# Patient Record
Sex: FEMALE | Race: WHITE | NOT HISPANIC OR LATINO | URBAN - METROPOLITAN AREA
[De-identification: names, ages, dates, MRNs, and addresses within clinical notes are randomized per-mention and may not be internally consistent; named-entity substitution may affect disease eponyms.]

---

## 2022-08-18 VITALS
HEART RATE: 78 BPM | RESPIRATION RATE: 16 BRPM | DIASTOLIC BLOOD PRESSURE: 87 MMHG | HEIGHT: 65 IN | WEIGHT: 134.26 LBS | SYSTOLIC BLOOD PRESSURE: 145 MMHG | TEMPERATURE: 98 F | OXYGEN SATURATION: 100 %

## 2022-08-18 RX ORDER — ALPRAZOLAM 0.25 MG
0 TABLET ORAL
Qty: 0 | Refills: 0 | DISCHARGE

## 2022-08-18 NOTE — ASU PATIENT PROFILE, ADULT - NSICDXPASTMEDICALHX_GEN_ALL_CORE_FT
PAST MEDICAL HISTORY:  History of IBS      PAST MEDICAL HISTORY:  H/O hiatal hernia     History of diverticulitis     History of IBS

## 2022-08-18 NOTE — PRE-OP CHECKLIST - SELECT TESTS ORDERED
BMP/CBC CBC/CMP/Urinalysis/EKG/COVID-19 CBC/CMP/Urinalysis/EKG/POCT Blood Glucose/COVID-19 CBC/CMP/Type and Screen/Urinalysis/EKG/POCT Blood Glucose/COVID-19

## 2022-08-19 ENCOUNTER — INPATIENT (INPATIENT)
Facility: HOSPITAL | Age: 67
LOS: 3 days | Discharge: HOME | DRG: 742 | End: 2022-08-23
Attending: OBSTETRICS & GYNECOLOGY | Admitting: OBSTETRICS & GYNECOLOGY
Payer: MEDICARE

## 2022-08-19 DIAGNOSIS — Z87.81 PERSONAL HISTORY OF (HEALED) TRAUMATIC FRACTURE: Chronic | ICD-10-CM

## 2022-08-19 PROCEDURE — 88307 TISSUE EXAM BY PATHOLOGIST: CPT | Mod: 26

## 2022-08-19 RX ORDER — OXYCODONE HYDROCHLORIDE 5 MG/1
5 TABLET ORAL
Refills: 0 | Status: DISCONTINUED | OUTPATIENT
Start: 2022-08-19 | End: 2022-08-19

## 2022-08-19 RX ORDER — IBUPROFEN 200 MG
600 TABLET ORAL EVERY 6 HOURS
Refills: 0 | Status: DISCONTINUED | OUTPATIENT
Start: 2022-08-19 | End: 2022-08-20

## 2022-08-19 RX ORDER — CELECOXIB 200 MG/1
400 CAPSULE ORAL ONCE
Refills: 0 | Status: COMPLETED | OUTPATIENT
Start: 2022-08-19 | End: 2022-08-19

## 2022-08-19 RX ORDER — PHENAZOPYRIDINE HCL 100 MG
200 TABLET ORAL EVERY 8 HOURS
Refills: 0 | Status: DISCONTINUED | OUTPATIENT
Start: 2022-08-19 | End: 2022-08-23

## 2022-08-19 RX ORDER — ACETAMINOPHEN 500 MG
1000 TABLET ORAL ONCE
Refills: 0 | Status: COMPLETED | OUTPATIENT
Start: 2022-08-19 | End: 2022-08-19

## 2022-08-19 RX ORDER — OXYCODONE HYDROCHLORIDE 5 MG/1
10 TABLET ORAL EVERY 4 HOURS
Refills: 0 | Status: DISCONTINUED | OUTPATIENT
Start: 2022-08-19 | End: 2022-08-19

## 2022-08-19 RX ORDER — SIMETHICONE 80 MG/1
80 TABLET, CHEWABLE ORAL EVERY 6 HOURS
Refills: 0 | Status: DISCONTINUED | OUTPATIENT
Start: 2022-08-19 | End: 2022-08-23

## 2022-08-19 RX ORDER — PANTOPRAZOLE SODIUM 20 MG/1
20 TABLET, DELAYED RELEASE ORAL DAILY
Refills: 0 | Status: DISCONTINUED | OUTPATIENT
Start: 2022-08-19 | End: 2022-08-19

## 2022-08-19 RX ORDER — TRAMADOL HYDROCHLORIDE 50 MG/1
25 TABLET ORAL EVERY 6 HOURS
Refills: 0 | Status: DISCONTINUED | OUTPATIENT
Start: 2022-08-19 | End: 2022-08-23

## 2022-08-19 RX ORDER — HEPARIN SODIUM 5000 [USP'U]/ML
5000 INJECTION INTRAVENOUS; SUBCUTANEOUS ONCE
Refills: 0 | Status: COMPLETED | OUTPATIENT
Start: 2022-08-19 | End: 2022-08-19

## 2022-08-19 RX ORDER — ACETAMINOPHEN 500 MG
1000 TABLET ORAL EVERY 6 HOURS
Refills: 0 | Status: DISCONTINUED | OUTPATIENT
Start: 2022-08-19 | End: 2022-08-23

## 2022-08-19 RX ORDER — KETOROLAC TROMETHAMINE 30 MG/ML
30 SYRINGE (ML) INJECTION EVERY 6 HOURS
Refills: 0 | Status: DISCONTINUED | OUTPATIENT
Start: 2022-08-19 | End: 2022-08-20

## 2022-08-19 RX ORDER — ALPRAZOLAM 0.25 MG
0 TABLET ORAL
Qty: 0 | Refills: 0 | DISCHARGE

## 2022-08-19 RX ORDER — PANTOPRAZOLE SODIUM 20 MG/1
40 TABLET, DELAYED RELEASE ORAL EVERY 24 HOURS
Refills: 0 | Status: DISCONTINUED | OUTPATIENT
Start: 2022-08-19 | End: 2022-08-21

## 2022-08-19 RX ORDER — HYDROMORPHONE HYDROCHLORIDE 2 MG/ML
0.5 INJECTION INTRAMUSCULAR; INTRAVENOUS; SUBCUTANEOUS
Refills: 0 | Status: DISCONTINUED | OUTPATIENT
Start: 2022-08-19 | End: 2022-08-23

## 2022-08-19 RX ORDER — FAMOTIDINE 10 MG/ML
20 INJECTION INTRAVENOUS DAILY
Refills: 0 | Status: DISCONTINUED | OUTPATIENT
Start: 2022-08-19 | End: 2022-08-21

## 2022-08-19 RX ORDER — SODIUM CHLORIDE 9 MG/ML
1000 INJECTION, SOLUTION INTRAVENOUS
Refills: 0 | Status: DISCONTINUED | OUTPATIENT
Start: 2022-08-19 | End: 2022-08-20

## 2022-08-19 RX ORDER — ONDANSETRON 8 MG/1
8 TABLET, FILM COATED ORAL EVERY 8 HOURS
Refills: 0 | Status: COMPLETED | OUTPATIENT
Start: 2022-08-19 | End: 2022-08-20

## 2022-08-19 RX ADMIN — Medication 1000 MILLIGRAM(S): at 18:55

## 2022-08-19 RX ADMIN — Medication 30 MILLIGRAM(S): at 23:25

## 2022-08-19 RX ADMIN — TRAMADOL HYDROCHLORIDE 25 MILLIGRAM(S): 50 TABLET ORAL at 22:34

## 2022-08-19 RX ADMIN — ONDANSETRON 8 MILLIGRAM(S): 8 TABLET, FILM COATED ORAL at 19:45

## 2022-08-19 RX ADMIN — Medication 1000 MILLIGRAM(S): at 06:43

## 2022-08-19 RX ADMIN — CELECOXIB 400 MILLIGRAM(S): 200 CAPSULE ORAL at 06:43

## 2022-08-19 RX ADMIN — Medication 30 MILLIGRAM(S): at 17:56

## 2022-08-19 RX ADMIN — Medication 1000 MILLIGRAM(S): at 17:55

## 2022-08-19 RX ADMIN — HEPARIN SODIUM 5000 UNIT(S): 5000 INJECTION INTRAVENOUS; SUBCUTANEOUS at 06:43

## 2022-08-19 RX ADMIN — SODIUM CHLORIDE 125 MILLILITER(S): 9 INJECTION, SOLUTION INTRAVENOUS at 12:21

## 2022-08-19 RX ADMIN — Medication 200 MILLIGRAM(S): at 17:55

## 2022-08-19 RX ADMIN — Medication 30 MILLIGRAM(S): at 18:11

## 2022-08-19 RX ADMIN — TRAMADOL HYDROCHLORIDE 25 MILLIGRAM(S): 50 TABLET ORAL at 21:34

## 2022-08-19 RX ADMIN — FAMOTIDINE 20 MILLIGRAM(S): 10 INJECTION INTRAVENOUS at 12:02

## 2022-08-19 RX ADMIN — Medication 30 MILLIGRAM(S): at 23:08

## 2022-08-19 RX ADMIN — Medication 1000 MILLIGRAM(S): at 12:21

## 2022-08-19 RX ADMIN — PANTOPRAZOLE SODIUM 40 MILLIGRAM(S): 20 TABLET, DELAYED RELEASE ORAL at 11:36

## 2022-08-19 RX ADMIN — SIMETHICONE 80 MILLIGRAM(S): 80 TABLET, CHEWABLE ORAL at 23:42

## 2022-08-19 RX ADMIN — Medication 1000 MILLIGRAM(S): at 11:36

## 2022-08-19 NOTE — H&P ADULT - HISTORY OF PRESENT ILLNESS
HPI:  65yo postmenopausal G0 w/hx of IBS here for scheduled ANTWON, BSO for fibroid uterus.                PAST MEDICAL & SURGICAL HISTORY:  History of IBS      History of diverticulitis      H/O hiatal hernia      H/O fracture of wrist  LEFT          MEDICATIONS  (STANDING): none    MEDICATIONS  (PRN): xanax prn      Allergies    penicillin (Rash)    Intolerances        PHYSICAL EXAM:   Vital Signs Last 24 Hrs  T(C): 36.7 (18 Aug 2022 10:24), Max: 36.7 (18 Aug 2022 10:24)  T(F): 98 (18 Aug 2022 10:24), Max: 98 (18 Aug 2022 10:24)  HR: 78 (18 Aug 2022 10:24) (78 - 78)  BP: 145/87 (18 Aug 2022 10:24) (145/87 - 145/87)  BP(mean): --  RR: 16 (18 Aug 2022 10:24) (16 - 16)  SpO2: 100% (18 Aug 2022 10:24) (100% - 100%)        **************************  Constitutional: Alert & Oriented x3, No acute distress, cooperative   Respiratory: Clear to ausculation bilaterally; no wheezing, rhonchi, or crackles  Cardiovascular: S1 &S2 physiologic, no murmurs, or gallops  Gastrointestinal: soft, non tender, positive bowel sounds, no rebound or guarding   Speculum exam:   Pelvic exam:   Extremities: no calf tenderness or swelling      LABS:                RADIOLOGY & ADDITIONAL STUDIES: HPI:  65yo postmenopausal G0 w/hx of IBS here for scheduled ANTWON, BSO for fibroid uterus.     Obhx: denies  Gynhx: uterine fibroids  PMH: IBS (C), hx diverticulitis  PSH: wrist surgery 2006  meds: zirtec prn  all: pen (childhood, rash)        PHYSICAL EXAM:   Vital Signs Last 24 Hrs  T(C): 36.7 (18 Aug 2022 10:24), Max: 36.7 (18 Aug 2022 10:24)  T(F): 98 (18 Aug 2022 10:24), Max: 98 (18 Aug 2022 10:24)  HR: 78 (18 Aug 2022 10:24) (78 - 78)  BP: 145/87 (18 Aug 2022 10:24) (145/87 - 145/87)  BP(mean): --  RR: 16 (18 Aug 2022 10:24) (16 - 16)  SpO2: 100% (18 Aug 2022 10:24) (100% - 100%)        **************************  Constitutional: Alert & Oriented x3, No acute distress, cooperative   Respiratory: Clear to ausculation bilaterally; no wheezing, rhonchi, or crackles  Cardiovascular: S1 &S2 physiologic, no murmurs, or gallops  Gastrointestinal: soft, non tender, positive bowel sounds, no rebound or guarding   Speculum exam:   Pelvic exam:   Extremities: no calf tenderness or swelling      LABS:                RADIOLOGY & ADDITIONAL STUDIES:

## 2022-08-19 NOTE — CHART NOTE - NSCHARTNOTEFT_GEN_A_CORE
Called by RN stating pt is complaining of bladder pain. Pt seen at bedside and examined.  Pt complaining of urge to urinate and abdominal cramping.  Stark catheter in place and UOP has been adequate since being out of the operating room.  Stark catheter flushed. Asked RN to do bladder scan stating she tried but was unable to due to pt's incision pain.  Pt is agreeable to bladder scan. Awaiting RN call for PVC.  Plan to give pyridium for bladder spasm. GYN TEAM aware.  Dr Yin notified. Called by RN stating pt is complaining of bladder pain. Pt seen at bedside and examined.  Pt complaining of urge to urinate and abdominal cramping.  Stark catheter in place and UOP has been adequate since being out of the operating room.  Stark catheter flushed. RN performed bladder scan and only saw about 1ml. Prescribing pyridium for bladder spasm PRN. GYN TEAM aware.  Dr Yin notified.

## 2022-08-19 NOTE — H&P ADULT - ASSESSMENT
65yo postmenopausal G0 w/hx of IBS here for scheduled ANTWON, BSO for fibroid uterus.   - consent signed  - will proceed with scheduled case  - d/w Dr. Yin

## 2022-08-19 NOTE — PRE-ANESTHESIA EVALUATION ADULT - NSANTHOSAYNRD_GEN_A_CORE
No. LUICE screening performed.  STOP BANG Legend: 0-2 = LOW Risk; 3-4 = INTERMEDIATE Risk; 5-8 = HIGH Risk

## 2022-08-19 NOTE — PRE-ANESTHESIA EVALUATION ADULT - NSANTHINDVINFOSD_GEN_ALL_CORE
Pt in clinic for nephrology appt    Pt states needs refill of nitrostat    Routing to Dr. Michael wilson for review   /patient/relative

## 2022-08-19 NOTE — PROGRESS NOTE ADULT - ASSESSMENT
A: 65yo POD0  s/p ANTWON, BSO for fibroid uterus, about 24w in size. Exparel used. Stable doing well at POC.      Plan:  1. Vital signs stable, continue to monitor per protocol  2. Pain control  3. DVT prophylaxis: SCDs, lovenox in AM after AM CBC  4. CV: IVH @ 125cc/hr  5. Pulm: Incentive spirometer (at least 10 times per hour while awake)   6. GI: Diet - CLD, ADAT  7. : Stark   8. Follow up labs: AM CBC  9. Activity: bedrest tonight

## 2022-08-19 NOTE — PACU DISCHARGE NOTE - COMMENTS
Pt post op total abdominal hysterectomy bilateral salpingo-ophrectomy. Pt is neurologically and hemodynamically stable and has met PACU discharge criteria. Report given to Chris lopez RN.

## 2022-08-19 NOTE — BRIEF OPERATIVE NOTE - OPERATION/FINDINGS
s/p ANTWON, BSO for fibroid uterus. Uterus enlarged to 24w in size. 3 large pedunculated fibroids noted, largest about 15cm. Normal appearing ovaries and tubes. IP ligaments clamped and tied, then bso performed. Round ligaments clamped, tied, then transected. Ureters palpated bilaterally. Bladder flap created with bovie and blunt dissection. Uterine arteries clamped then tied before being transected. Cardinal ligaments clamped, tied,  then transected with scalpel. Uterus and cervix amputated with bovie, vaginal cuff closed with 0 vicryl figure of 8's. Fascia closed with 0 vicryl. Exparel injected into subq and fascia. Subq closed with vicryl, skin closed with 3-0 monocryl and dermabond.

## 2022-08-20 DIAGNOSIS — N17.9 ACUTE KIDNEY FAILURE, UNSPECIFIED: ICD-10-CM

## 2022-08-20 LAB
ALBUMIN SERPL ELPH-MCNC: 2.6 G/DL — LOW (ref 3.3–5)
ALP SERPL-CCNC: 51 U/L — SIGNIFICANT CHANGE UP (ref 40–120)
ALT FLD-CCNC: 7 U/L — LOW (ref 10–45)
ANION GAP SERPL CALC-SCNC: 8 MMOL/L — SIGNIFICANT CHANGE UP (ref 5–17)
ANION GAP SERPL CALC-SCNC: 9 MMOL/L — SIGNIFICANT CHANGE UP (ref 5–17)
ANION GAP SERPL CALC-SCNC: 9 MMOL/L — SIGNIFICANT CHANGE UP (ref 5–17)
APPEARANCE UR: CLEAR — SIGNIFICANT CHANGE UP
AST SERPL-CCNC: 13 U/L — SIGNIFICANT CHANGE UP (ref 10–40)
BACTERIA # UR AUTO: PRESENT /HPF
BILIRUB SERPL-MCNC: 0.4 MG/DL — SIGNIFICANT CHANGE UP (ref 0.2–1.2)
BILIRUB SERPL-MCNC: 0.6 MG/DL — SIGNIFICANT CHANGE UP (ref 0.2–1.2)
BILIRUB UR-MCNC: NEGATIVE — SIGNIFICANT CHANGE UP
BUN SERPL-MCNC: 13 MG/DL — SIGNIFICANT CHANGE UP (ref 7–23)
BUN SERPL-MCNC: 14 MG/DL — SIGNIFICANT CHANGE UP (ref 7–23)
BUN SERPL-MCNC: 15 MG/DL — SIGNIFICANT CHANGE UP (ref 7–23)
CALCIUM SERPL-MCNC: 7.9 MG/DL — LOW (ref 8.4–10.5)
CALCIUM SERPL-MCNC: 8.1 MG/DL — LOW (ref 8.4–10.5)
CALCIUM SERPL-MCNC: 8.2 MG/DL — LOW (ref 8.4–10.5)
CHLORIDE SERPL-SCNC: 100 MMOL/L — SIGNIFICANT CHANGE UP (ref 96–108)
CHLORIDE SERPL-SCNC: 95 MMOL/L — LOW (ref 96–108)
CHLORIDE SERPL-SCNC: 99 MMOL/L — SIGNIFICANT CHANGE UP (ref 96–108)
CO2 SERPL-SCNC: 21 MMOL/L — LOW (ref 22–31)
CO2 SERPL-SCNC: 24 MMOL/L — SIGNIFICANT CHANGE UP (ref 22–31)
CO2 SERPL-SCNC: 25 MMOL/L — SIGNIFICANT CHANGE UP (ref 22–31)
COLOR SPEC: YELLOW — SIGNIFICANT CHANGE UP
CREAT ?TM UR-MCNC: 151 MG/DL — SIGNIFICANT CHANGE UP
CREAT SERPL-MCNC: 1.15 MG/DL — SIGNIFICANT CHANGE UP (ref 0.5–1.3)
CREAT SERPL-MCNC: 1.19 MG/DL — SIGNIFICANT CHANGE UP (ref 0.5–1.3)
CREAT SERPL-MCNC: 1.24 MG/DL — SIGNIFICANT CHANGE UP (ref 0.5–1.3)
DIFF PNL FLD: ABNORMAL
EGFR: 47 ML/MIN/1.73M2 — LOW
EGFR: 50 ML/MIN/1.73M2 — LOW
EGFR: 52 ML/MIN/1.73M2 — LOW
EPI CELLS # UR: SIGNIFICANT CHANGE UP /HPF (ref 0–5)
FERRITIN SERPL-MCNC: 83 NG/ML — SIGNIFICANT CHANGE UP (ref 15–150)
GLUCOSE SERPL-MCNC: 143 MG/DL — HIGH (ref 70–99)
GLUCOSE SERPL-MCNC: 158 MG/DL — HIGH (ref 70–99)
GLUCOSE SERPL-MCNC: 159 MG/DL — HIGH (ref 70–99)
GLUCOSE UR QL: NEGATIVE — SIGNIFICANT CHANGE UP
HCT VFR BLD CALC: 23.6 % — LOW (ref 34.5–45)
HCT VFR BLD CALC: 25.8 % — LOW (ref 34.5–45)
HCT VFR BLD CALC: 27.1 % — LOW (ref 34.5–45)
HCV AB S/CO SERPL IA: 0.03 S/CO — SIGNIFICANT CHANGE UP
HCV AB SERPL-IMP: SIGNIFICANT CHANGE UP
HGB BLD-MCNC: 7.3 G/DL — LOW (ref 11.5–15.5)
HGB BLD-MCNC: 8.4 G/DL — LOW (ref 11.5–15.5)
HGB BLD-MCNC: 8.8 G/DL — LOW (ref 11.5–15.5)
INR BLD: 1.1 — SIGNIFICANT CHANGE UP (ref 0.88–1.16)
IRON SATN MFR SERPL: 15 % — SIGNIFICANT CHANGE UP (ref 14–50)
IRON SATN MFR SERPL: 23 UG/DL — LOW (ref 30–160)
KETONES UR-MCNC: NEGATIVE — SIGNIFICANT CHANGE UP
LEUKOCYTE ESTERASE UR-ACNC: NEGATIVE — SIGNIFICANT CHANGE UP
MAGNESIUM SERPL-MCNC: 1.4 MG/DL — LOW (ref 1.6–2.6)
MAGNESIUM SERPL-MCNC: 2 MG/DL — SIGNIFICANT CHANGE UP (ref 1.6–2.6)
MCHC RBC-ENTMCNC: 29.6 PG — SIGNIFICANT CHANGE UP (ref 27–34)
MCHC RBC-ENTMCNC: 29.9 PG — SIGNIFICANT CHANGE UP (ref 27–34)
MCHC RBC-ENTMCNC: 30 PG — SIGNIFICANT CHANGE UP (ref 27–34)
MCHC RBC-ENTMCNC: 30.9 GM/DL — LOW (ref 32–36)
MCHC RBC-ENTMCNC: 32.5 GM/DL — SIGNIFICANT CHANGE UP (ref 32–36)
MCHC RBC-ENTMCNC: 32.6 GM/DL — SIGNIFICANT CHANGE UP (ref 32–36)
MCV RBC AUTO: 91.2 FL — SIGNIFICANT CHANGE UP (ref 80–100)
MCV RBC AUTO: 91.8 FL — SIGNIFICANT CHANGE UP (ref 80–100)
MCV RBC AUTO: 97.1 FL — SIGNIFICANT CHANGE UP (ref 80–100)
MELD SCORE WITH DIALYSIS: 24 POINTS — SIGNIFICANT CHANGE UP
MELD SCORE WITHOUT DIALYSIS: 9 POINTS — SIGNIFICANT CHANGE UP
NITRITE UR-MCNC: POSITIVE
NRBC # BLD: 0 /100 WBCS — SIGNIFICANT CHANGE UP (ref 0–0)
OSMOLALITY UR: 347 MOSM/KG — SIGNIFICANT CHANGE UP (ref 300–900)
PH UR: 6 — SIGNIFICANT CHANGE UP (ref 5–8)
PHOSPHATE SERPL-MCNC: 4.5 MG/DL — SIGNIFICANT CHANGE UP (ref 2.5–4.5)
PHOSPHATE SERPL-MCNC: 4.9 MG/DL — HIGH (ref 2.5–4.5)
PLATELET # BLD AUTO: 241 K/UL — SIGNIFICANT CHANGE UP (ref 150–400)
PLATELET # BLD AUTO: 347 K/UL — SIGNIFICANT CHANGE UP (ref 150–400)
PLATELET # BLD AUTO: 357 K/UL — SIGNIFICANT CHANGE UP (ref 150–400)
POTASSIUM SERPL-MCNC: 4 MMOL/L — SIGNIFICANT CHANGE UP (ref 3.5–5.3)
POTASSIUM SERPL-MCNC: 4.5 MMOL/L — SIGNIFICANT CHANGE UP (ref 3.5–5.3)
POTASSIUM SERPL-MCNC: 4.7 MMOL/L — SIGNIFICANT CHANGE UP (ref 3.5–5.3)
POTASSIUM SERPL-SCNC: 4 MMOL/L — SIGNIFICANT CHANGE UP (ref 3.5–5.3)
POTASSIUM SERPL-SCNC: 4.5 MMOL/L — SIGNIFICANT CHANGE UP (ref 3.5–5.3)
POTASSIUM SERPL-SCNC: 4.7 MMOL/L — SIGNIFICANT CHANGE UP (ref 3.5–5.3)
PROT SERPL-MCNC: 4.6 G/DL — LOW (ref 6–8.3)
PROT UR-MCNC: NEGATIVE MG/DL — SIGNIFICANT CHANGE UP
PROTHROM AB SERPL-ACNC: 13.1 SEC — SIGNIFICANT CHANGE UP (ref 10.5–13.4)
RBC # BLD: 2.43 M/UL — LOW (ref 3.8–5.2)
RBC # BLD: 2.81 M/UL — LOW (ref 3.8–5.2)
RBC # BLD: 2.97 M/UL — LOW (ref 3.8–5.2)
RBC # FLD: 12.6 % — SIGNIFICANT CHANGE UP (ref 10.3–14.5)
RBC # FLD: 12.6 % — SIGNIFICANT CHANGE UP (ref 10.3–14.5)
RBC # FLD: 12.9 % — SIGNIFICANT CHANGE UP (ref 10.3–14.5)
RBC CASTS # UR COMP ASSIST: < 5 /HPF — SIGNIFICANT CHANGE UP
SODIUM SERPL-SCNC: 128 MMOL/L — LOW (ref 135–145)
SODIUM SERPL-SCNC: 129 MMOL/L — LOW (ref 135–145)
SODIUM SERPL-SCNC: 133 MMOL/L — LOW (ref 135–145)
SODIUM UR-SCNC: <20 MMOL/L — SIGNIFICANT CHANGE UP
SP GR SPEC: 1.01 — SIGNIFICANT CHANGE UP (ref 1–1.03)
TIBC SERPL-MCNC: 155 UG/DL — LOW (ref 220–430)
TRANSFERRIN SERPL-MCNC: 144 MG/DL — LOW (ref 200–360)
UIBC SERPL-MCNC: 132 UG/DL — SIGNIFICANT CHANGE UP (ref 110–370)
UROBILINOGEN FLD QL: 0.2 E.U./DL — SIGNIFICANT CHANGE UP
WBC # BLD: 11.03 K/UL — HIGH (ref 3.8–10.5)
WBC # BLD: 12.27 K/UL — HIGH (ref 3.8–10.5)
WBC # BLD: 14.05 K/UL — HIGH (ref 3.8–10.5)
WBC # FLD AUTO: 11.03 K/UL — HIGH (ref 3.8–10.5)
WBC # FLD AUTO: 12.27 K/UL — HIGH (ref 3.8–10.5)
WBC # FLD AUTO: 14.05 K/UL — HIGH (ref 3.8–10.5)
WBC UR QL: < 5 /HPF — SIGNIFICANT CHANGE UP

## 2022-08-20 PROCEDURE — 74176 CT ABD & PELVIS W/O CONTRAST: CPT | Mod: 26

## 2022-08-20 PROCEDURE — 99223 1ST HOSP IP/OBS HIGH 75: CPT

## 2022-08-20 RX ORDER — MAGNESIUM SULFATE 500 MG/ML
2 VIAL (ML) INJECTION ONCE
Refills: 0 | Status: COMPLETED | OUTPATIENT
Start: 2022-08-20 | End: 2022-08-20

## 2022-08-20 RX ORDER — ALPRAZOLAM 0.25 MG
0.25 TABLET ORAL AT BEDTIME
Refills: 0 | Status: DISCONTINUED | OUTPATIENT
Start: 2022-08-20 | End: 2022-08-23

## 2022-08-20 RX ORDER — SODIUM CHLORIDE 9 MG/ML
1000 INJECTION, SOLUTION INTRAVENOUS ONCE
Refills: 0 | Status: COMPLETED | OUTPATIENT
Start: 2022-08-20 | End: 2022-08-20

## 2022-08-20 RX ORDER — SODIUM CHLORIDE 9 MG/ML
1000 INJECTION INTRAMUSCULAR; INTRAVENOUS; SUBCUTANEOUS
Refills: 0 | Status: DISCONTINUED | OUTPATIENT
Start: 2022-08-20 | End: 2022-08-21

## 2022-08-20 RX ADMIN — PANTOPRAZOLE SODIUM 40 MILLIGRAM(S): 20 TABLET, DELAYED RELEASE ORAL at 13:01

## 2022-08-20 RX ADMIN — Medication 25 GRAM(S): at 08:24

## 2022-08-20 RX ADMIN — SODIUM CHLORIDE 125 MILLILITER(S): 9 INJECTION INTRAMUSCULAR; INTRAVENOUS; SUBCUTANEOUS at 15:14

## 2022-08-20 RX ADMIN — Medication 0.25 MILLIGRAM(S): at 22:39

## 2022-08-20 RX ADMIN — Medication 1000 MILLIGRAM(S): at 06:48

## 2022-08-20 RX ADMIN — Medication 1000 MILLIGRAM(S): at 13:58

## 2022-08-20 RX ADMIN — Medication 1000 MILLIGRAM(S): at 12:58

## 2022-08-20 RX ADMIN — Medication 1000 MILLIGRAM(S): at 19:30

## 2022-08-20 RX ADMIN — Medication 30 MILLIGRAM(S): at 06:48

## 2022-08-20 RX ADMIN — ONDANSETRON 8 MILLIGRAM(S): 8 TABLET, FILM COATED ORAL at 09:57

## 2022-08-20 RX ADMIN — FAMOTIDINE 20 MILLIGRAM(S): 10 INJECTION INTRAVENOUS at 13:00

## 2022-08-20 RX ADMIN — Medication 1000 MILLIGRAM(S): at 23:41

## 2022-08-20 RX ADMIN — Medication 1000 MILLIGRAM(S): at 05:48

## 2022-08-20 RX ADMIN — SODIUM CHLORIDE 1000 MILLILITER(S): 9 INJECTION, SOLUTION INTRAVENOUS at 14:15

## 2022-08-20 RX ADMIN — Medication 1000 MILLIGRAM(S): at 18:30

## 2022-08-20 RX ADMIN — Medication 30 MILLIGRAM(S): at 05:48

## 2022-08-20 NOTE — CONSULT NOTE ADULT - ASSESSMENT
66 y.o. F patient s/p [facility] 8/19.  Post op Hg downtrending, currently 7.3, and Cre 1.24 (pre op Hg 15, Cre 0.7). Currently getting 1U PRBC. Renal, Medicine following.  CT showing hemoperitoneum in all 4 quadrants and 14cm hematoma intraperitoneal pelvis.  Stark in place, draining clear yellow urine, 780cc during day.    Consult called regarding whether there would be utility for ureteral stents.

## 2022-08-20 NOTE — CONSULT NOTE ADULT - SUBJECTIVE AND OBJECTIVE BOX
CONSULT NOTE - INTERNAL MEDICINE  *INCOMPLETE UNTIL DISCUSSED WITH MEDICINE ATTENDING*    ABIEL BROOKS  [id number]  66y  Female      Patient is a 66y old  Female who presents with a chief complaint of       PAST MEDICAL/SURGICAL HISTORY  PAST MEDICAL & SURGICAL HISTORY:  History of IBS      History of diverticulitis      H/O hiatal hernia      H/O fracture of wrist  LEFT          REVIEW OF SYSTEMS:  CONSTITUTIONAL: No fever, weight loss, or fatigue  EYES: No eye pain, visual disturbances, or discharge  ENMT:  No difficulty hearing, tinnitus, vertigo; No sinus or throat pain  NECK: No pain or stiffness  BREASTS: No pain, masses, or nipple discharge  RESPIRATORY: No cough, wheezing, chills or hemoptysis; No shortness of breath  CARDIOVASCULAR: No chest pain, palpitations, dizziness, or leg swelling  GASTROINTESTINAL: mild abdominal pain. o nausea, vomiting, or hematemesis; No diarrhea or constipation. No melena or hematochezia.  GENITOURINARY: No dysuria, frequency, hematuria, or incontinence  NEUROLOGICAL: No headaches, memory loss, loss of strength, numbness, or tremors  SKIN: No itching, burning, rashes, or lesions   LYMPH NODES: No enlarged glands  ENDOCRINE: No heat or cold intolerance; No hair loss  MUSCULOSKELETAL: No joint pain or swelling; No muscle, back, or extremity pain  PSYCHIATRIC: No depression, anxiety, mood swings, or difficulty sleeping  HEME/LYMPH: No easy bruising, or bleeding gums  ALLERY AND IMMUNOLOGIC: No hives or eczema    T(C): 36.7 (08-20-22 @ 08:30), Max: 36.8 (08-19-22 @ 20:06)  HR: 82 (08-20-22 @ 08:30) (67 - 100)  BP: 99/63 (08-20-22 @ 08:30) (92/60 - 100/64)  RR: 17 (08-20-22 @ 08:30) (16 - 18)  SpO2: 99% (08-20-22 @ 08:30) (96% - 99%)  Wt(kg): --Vital Signs Last 24 Hrs  T(C): 36.7 (20 Aug 2022 08:30), Max: 36.8 (19 Aug 2022 20:06)  T(F): 98 (20 Aug 2022 08:30), Max: 98.2 (19 Aug 2022 20:06)  HR: 82 (20 Aug 2022 08:30) (67 - 100)  BP: 99/63 (20 Aug 2022 08:30) (92/60 - 100/64)  BP(mean): --  RR: 17 (20 Aug 2022 08:30) (16 - 18)  SpO2: 99% (20 Aug 2022 08:30) (96% - 99%)    Parameters below as of 20 Aug 2022 08:30  Patient On (Oxygen Delivery Method): room air        PHYSICAL EXAM:  GENERAL: NAD, well-groomed, well-developed  HEAD:  Atraumatic, Normocephalic  EYES: EOMI, PERRLA, conjunctiva and sclera clear  ENMT: No tonsillar erythema, exudates, or enlargement; Moist mucous membranes, Good dentition, No lesions  NECK: Supple, No JVD, Normal thyroid  NERVOUS SYSTEM:  Alert & Oriented X3, Good concentration; Motor Strength 5/5 B/L upper and lower extremities; DTRs 2+ intact and symmetric  CHEST/LUNG: Clear to percussion bilaterally; No rales, rhonchi, wheezing, or rubs  HEART: Regular rate and rhythm; No murmurs, rubs, or gallops  ABDOMEN: mildly distended mildly tender  EXTREMITIES:  2+ Peripheral Pulses, No clubbing, cyanosis, or edema  LYMPH: No lymphadenopathy noted  SKIN: No rashes or lesions    Consultant(s) Notes Reviewed:  [x ] YES  [ ] NO  Care Discussed with Consultants/Other Providers [ x] YES  [ ] NO    LABS:                        8.4    12.27 )-----------( 347      ( 20 Aug 2022 10:11 )             25.8     08-20    129<L>  |  95<L>  |  15  ----------------------------<  143<H>  4.0   |  25  |  1.19    Ca    8.1<L>      20 Aug 2022 10:11  Phos  4.5     08-20  Mg     2.0     08-20    TPro  x   /  Alb  x   /  TBili  0.6  /  DBili  x   /  AST  x   /  ALT  x   /  AlkPhos  x   08-20    PT/INR - ( 20 Aug 2022 05:24 )   PT: 13.1 sec;   INR: 1.10                      RADIOLOGY & ADDITIONAL TESTS:    Imaging Personally Reviewed:  [ ] YES  [ ] NO

## 2022-08-20 NOTE — CONSULT NOTE ADULT - PROBLEM SELECTOR RECOMMENDATION 9
-Cased discussed with senior on call resident, whose recs are:  -Continue cortez to leg bag, monitor urine output  -Trend creatinine  -IV fluid hydration  -Repeat Renal US in 48hours to assess for hydro  -No acute surgical intervention at this time

## 2022-08-20 NOTE — CONSULT NOTE ADULT - ASSESSMENT
66F with pmhx of IBS who presented to the hospital in the setting of requiring ANTWON due to fibroid uterus. GYN reports preop cr of 0.7, no known history of ckd. Per team has a known Cr of 0.7. Creatinine also noted to be up to 1.15 -> 1.19. Patient with cortez catheter in place. Of note patients Hg preop was 15, has dropped to 8's. Per GYN no concern for bleed at this time and possible blood loss could have been 2/2 blood volume in uterus. Urine lytes indicate pre renal non oliguric EUGENE.   Nephrology also consulted    Recommendations:    Patient currently receiving 1 L LR bolus,   -c/w 125 cc/hr after for maintenance  -c/w foleys - monitor I&Os  -f/u repeat BMP later today  -Obtain renal sono  -f/u nephrology recs  -encourage po intake     C/f RP bleed i/s/o recent gyn procedure and drop in Hg from 15 to 8  -f/u US   -monitor hg    Note not finalized until attending attestation complete

## 2022-08-20 NOTE — CHART NOTE - NSCHARTNOTEFT_GEN_A_CORE
Nephrology Chart Note    66F with pmhx of IBS who presented to the hospital in the setting of requiring ANTWON due to fibroid uterus. Per team has a known Cr of 0.7. Per surgical documentation EBL was 25-50cc. However noted to have drop of Hgb from 12 -> 8.8 -> 8.4. Creatinine also noted to be up to 1.15 -> 1.19. Urine output in the last 24 hours was 650, and this  so far with a cotrez cathether. Vitals noted and remarkable for hypotension compared to 8/19 with elevation in HR. Nephrology consulted for non-oliguric EUGENE    #non-oligouric EUGENE  #Decreasing Hemoglobin  Pt noted to have drop in Hgb from 12 ->8.8 and now 8.4. Creatinine has risen from 0.7 -> 1.19. BP noted to be hypotensive to the 90's and heart rate elevated compared to day prior. Urine studies performed with UA <20 and Osm of 300 suggestive of a pre-renal etiology which could be seen in hypoperfusion state. Given recent surgery and drop in Hgb, concerned for possible RP bleed that needs to be evaluated immediately. May begin to develop an iATN with low SBP's  -Please obtain a CT scan with IV contrast STAT to evaluate for RP bleed; ok with contrast in this scenario given concern for possible RP bleed  -Please obtain UA  -Give 1L of LR at a STAT bolus  -Continue with 125cc/hr of LR  -C/w cortez to monitor strict I/O's  -Labs reviewed  -Avoid NSAID's (Celebrex and Toradol)  -Full consult note to follow in AM      Nikia Norton DO  PGY 5 - Nephrology Fellow  [phone] Nephrology Chart Note    66F with pmhx of IBS who presented to the hospital in the setting of requiring ANTWON due to fibroid uterus. Per team has a known Cr of 0.7. Per surgical documentation EBL was 25-50cc. However noted to have drop of Hgb from 12 -> 8.8 -> 8.4. Creatinine also noted to be up to 1.15 -> 1.19. Urine output in the last 24 hours was 650, and this  so far with a cortez cathether. Vitals noted and remarkable for hypotension compared to 8/19 with elevation in HR. Nephrology consulted for non-oliguric EUGENE    #non-oligouric EUGENE  #Anemia 2/2 ?ABL vs RP bleed?  Pt noted to have drop in Hgb from 12 ->8.8 and now 8.4. Creatinine has risen from 0.7 -> 1.19. BP noted to be hypotensive to the 90's and heart rate elevated compared to day prior. Urine studies performed with UA <20 and Osm of 300 suggestive of a pre-renal etiology which could be seen in hypoperfusion state. Given recent surgery and drop in Hgb, concerned for possible RP bleed that needs to be evaluated immediately. May begin to develop an iATN with low SBP's  -Please obtain a CT scan with IV contrast STAT to evaluate for RP bleed; ok with contrast in this scenario given concern for possible RP bleed  -Please obtain UA  -Give 1L of LR at a STAT bolus  -Obtain iron profile and ferritin  -Continue with 125cc/hr of LR  -C/w cortze to monitor strict I/O's  -Labs reviewed  -Avoid NSAID's (Celebrex and Toradol)  -Full consult note to follow in AM      Nikia Norton DO  PGY 5 - Nephrology Fellow  [phone] Nephrology Chart Note    66F with pmhx of IBS who presented to the hospital in the setting of requiring ANTWON due to fibroid uterus. Per team has a known Cr of 0.7. Per surgical documentation EBL was 25-50cc. However noted to have drop of Hgb from 12 -> 8.8 -> 8.4. Creatinine also noted to be up to 1.15 -> 1.19. Urine output in the last 24 hours was 650, and this  so far with a cortez cathether. Vitals noted and remarkable for hypotension compared to 8/19 with elevation in HR. Nephrology consulted for non-oliguric EUGENE    #non-oligouric EUGENE  #Anemia 2/2 ?ABL vs RP bleed?  Pt noted to have drop in Hgb from 12 ->8.8 and now 8.4. Creatinine has risen from 0.7 -> 1.19. BP noted to be hypotensive to the 90's and heart rate elevated compared to day prior. Urine studies performed with UA <20 and Osm of 300 suggestive of a pre-renal etiology which could be seen in hypoperfusion state. Given recent surgery and drop in Hgb, concerned for possible RP bleed that needs to be evaluated immediately. May begin to develop an iATN with low SBP's  -Please obtain UA  -Obtain renal sono  -Obtain BMP and CMP at 4pm; pending labs will discuss need for transfusion and need for possible CT scan  -Give 1L of LR at a STAT bolus  -Obtain iron profile and ferritin  -Continue with 125cc/hr of LR  -C/w cortez to monitor strict I/O's  -Labs reviewed  -Avoid NSAID's (Celebrex and Toradol)  -Full consult note to follow in AM      Nikia Norton DO  PGY 5 - Nephrology Fellow  [phone] Nephrology Chart Note    66F with pmhx of IBS who presented to the hospital in the setting of requiring ANTWON due to fibroid uterus. Per team has a known Cr of 0.7. Per surgical documentation EBL was 25-50cc. However noted to have drop of Hgb from 12 -> 8.8 -> 8.4. Creatinine also noted to be up to 1.15 -> 1.19. Urine output in the last 24 hours was 650, and this  so far with a cortez cathether. Vitals noted and remarkable for hypotension compared to 8/19 with elevation in HR. Nephrology consulted for non-oliguric EUGENE    #non-oligouric EUGENE  #Anemia 2/2 ?ABL vs RP bleed?  Pt noted to have drop in Hgb from 12 ->8.8 and now 8.4. Creatinine has risen from 0.7 -> 1.19. BP noted to be hypotensive to the 90's and heart rate elevated compared to day prior. Urine studies performed with UA <20 and Osm of 300 suggestive of a pre-renal etiology which could be seen in hypoperfusion state. Given recent surgery and drop in Hgb, concerned for possible RP bleed that needs to be evaluated immediately. May begin to develop an iATN with low SBP's  -Please obtain UA  -Obtain renal sono  -Obtain BMP and CMP at 4pm; pending labs will discuss need for transfusion and need for possible CT scan  -Give 1L of LR at a STAT bolus  -Obtain iron profile and ferritin  -Continue with 125cc/hr of LR  -C/w cortez to monitor strict I/O's  -Labs reviewed  -Avoid NSAID's (Celebrex and Toradol)  -Full consult note to follow in AM      Nikia Norton DO  PGY 5 - Nephrology Fellow  [phone]      *Addendum 5:11PM    After reviewing 4PM labs, noted Hgb down to 7.3 from 8.4 after 1L of LR bolus + maintenance fluids. Creatinine with slight increase as well. Still remain suspicious for possible intra-abdominal process such as slow bleed vs ureter or bladder involvement. Relayed recommendation from Nephrology perspective would recommend pursuing CT A/P with IV contrast. Team to discuss with attending

## 2022-08-20 NOTE — CONSULT NOTE ADULT - SUBJECTIVE AND OBJECTIVE BOX
66 y.o. F patient with EUGENE/anemia s/p ANTWON-BSO .  Stark placed after experiencing abdominal discomfort post op.  Currently with abdominal discomfort, but no back pain.  No N/V/D/F.      PAST MEDICAL & SURGICAL HISTORY:  History of IBS  History of diverticulitis  H/O hiatal hernia  H/O fracture of left wrist    ALLERGIES:  penicillin (Rash)    LABS/IMAGIN.3    11.03 )-----------( 241      ( 20 Aug 2022 16:35 )             23.6   08-20    128<L>  |  99  |  14  ----------------------------<  158<H>  4.5   |  21<L>  |  1.24    Ca    7.9<L>      20 Aug 2022 16:35  Phos  4.5     08-20  Mg     2.0     08-20    TPro  4.6<L>  /  Alb  2.6<L>  /  TBili  0.4  /  DBili  x   /  AST  13  /  ALT  7<L>  /  AlkPhos  51  08-20    Urinalysis Basic - ( 20 Aug 2022 14:38 )    Color: Yellow / Appearance: Clear / S.010 / pH: x  Gluc: x / Ketone: NEGATIVE  / Bili: Negative / Urobili: 0.2 E.U./dL   Blood: x / Protein: NEGATIVE mg/dL / Nitrite: POSITIVE   Leuk Esterase: NEGATIVE / RBC: < 5 /HPF / WBC < 5 /HPF   Sq Epi: x / Non Sq Epi: 0-5 /HPF / Bacteria: Present /HPF    CT abd/pel: (prelim)   IMPRESSION:  1. Large volume hemoperitoneum in all 4 quadrants and the pelvis in   addition to  a formed 14 cm hematoma in anterior intraperitoneal pelvis. Small amount   of  hematoma at the vaginal cuff.  2. Stranding adjacent to a diverticular segment of the sigmoid colon   (images  , axial series 3) is suspicious for acute diverticulitis though   this may  be edema/stranding related to recent surgery and presence of large volume  hemoperitoneum.      PHYSICAL EXAM:  Vital Signs Last 24 Hrs  T(C): 37 (20 Aug 2022 20:14), Max: 37 (20 Aug 2022 20:14)  T(F): 98.6 (20 Aug 2022 20:14), Max: 98.6 (20 Aug 2022 20:14)  HR: 100 (20 Aug 2022 20:14) (82 - 100)  BP: 96/58 (20 Aug 2022 20:14) (92/60 - 119/75)  BP(mean): --  RR: 16 (20 Aug 2022 20:14) (16 - 17)  SpO2: 95% (20 Aug 2022 20:14) (95% - 99%)    Parameters below as of 20 Aug 2022 20:14  Patient On (Oxygen Delivery Method): room air      Gen: NAD, AAOx3, family at bedside           66 y.o. F patient with EUGENE/anemia s/p ANTWON-BSO .  Stark placed after experiencing abdominal discomfort post op.  Currently with abdominal discomfort, but no back pain.  No N/V/D/F.      PAST MEDICAL & SURGICAL HISTORY:  History of IBS  History of diverticulitis  H/O hiatal hernia  H/O fracture of left wrist    ALLERGIES:  penicillin (Rash)    LABS/IMAGIN.3    11.03 )-----------( 241      ( 20 Aug 2022 16:35 )             23.6   08-20    128<L>  |  99  |  14  ----------------------------<  158<H>  4.5   |  21<L>  |  1.24    Ca    7.9<L>      20 Aug 2022 16:35  Phos  4.5     08-20  Mg     2.0     08-20    TPro  4.6<L>  /  Alb  2.6<L>  /  TBili  0.4  /  DBili  x   /  AST  13  /  ALT  7<L>  /  AlkPhos  51  08-20    Urinalysis Basic - ( 20 Aug 2022 14:38 )    Color: Yellow / Appearance: Clear / S.010 / pH: x  Gluc: x / Ketone: NEGATIVE  / Bili: Negative / Urobili: 0.2 E.U./dL   Blood: x / Protein: NEGATIVE mg/dL / Nitrite: POSITIVE   Leuk Esterase: NEGATIVE / RBC: < 5 /HPF / WBC < 5 /HPF   Sq Epi: x / Non Sq Epi: 0-5 /HPF / Bacteria: Present /HPF    CT abd/pel: (prelim)   IMPRESSION:  1. Large volume hemoperitoneum in all 4 quadrants and the pelvis in   addition to  a formed 14 cm hematoma in anterior intraperitoneal pelvis. Small amount   of  hematoma at the vaginal cuff.  2. Stranding adjacent to a diverticular segment of the sigmoid colon   (images  , axial series 3) is suspicious for acute diverticulitis though   this may  be edema/stranding related to recent surgery and presence of large volume  hemoperitoneum.      PHYSICAL EXAM:  Vital Signs Last 24 Hrs  T(C): 37 (20 Aug 2022 20:14), Max: 37 (20 Aug 2022 20:14)  T(F): 98.6 (20 Aug 2022 20:14), Max: 98.6 (20 Aug 2022 20:14)  HR: 100 (20 Aug 2022 20:14) (82 - 100)  BP: 96/58 (20 Aug 2022 20:14) (92/60 - 119/75)  BP(mean): --  RR: 16 (20 Aug 2022 20:14) (16 - 17)  SpO2: 95% (20 Aug 2022 20:14) (95% - 99%)    Parameters below as of 20 Aug 2022 20:14  Patient On (Oxygen Delivery Method): room air      NAD, AAOx3, family at bedside  Denies back pain b/l  Stark in place draining clear yellow urine

## 2022-08-20 NOTE — CHART NOTE - NSCHARTNOTEFT_GEN_A_CORE
Pt evaluated at bedside. Pt states she feels overall with some fatigue. Pt is eating sitting up in bed. she states she got out of bed earlier in the day and walked around her room but felt slightly lightheaded at the time. PT was sitting in bed with 1uPRBC running. Pt denied fevers, chills, SOB, lightheadedness, CP, palpitation, N/V    PE: GEN: NAD  ABD: soft, mild distention, appropriately tender to palpation, no rebound or guarding    Plan to draw post transfusion CBC and consult IR for possible drainage of hematoma seen on CT.

## 2022-08-20 NOTE — CONSULT NOTE ADULT - ATTENDING COMMENTS
Patient was seen and examined at bedside. Case discuss with resident.  In brief summary the pt is a 66F with pmhx of IBS who presented to the hospital in the setting of requiring ANTWON due to fibroid uterus. Pt's initial cr of 0.7,now increased to 1.19. In addition pt with decrease in Hgb from 14 to 8 following surgery.     OBJECTIVE:  Vital Signs Last 24 Hrs  T(C): 36.7 (20 Aug 2022 08:30), Max: 36.8 (19 Aug 2022 20:06)  T(F): 98 (20 Aug 2022 08:30), Max: 98.2 (19 Aug 2022 20:06)  HR: 82 (20 Aug 2022 08:30) (67 - 100)  BP: 99/63 (20 Aug 2022 08:30) (92/60 - 100/64)  BP(mean): --  RR: 17 (20 Aug 2022 08:30) (16 - 18)  SpO2: 99% (20 Aug 2022 08:30) (96% - 99%)    Parameters below as of 20 Aug 2022 08:30  Patient On (Oxygen Delivery Method): room air    PHYSICAL EXAM:  Gen: NAD sitting up in bed; family at bedside   CV: RRR, +S1/S2, no mumur  Pulm: CTA b/l no wheezing or crackles   Abd: soft, NTND + BS no rebound or guarding       LABS:                        8.4    12.27 )-----------( 347      ( 20 Aug 2022 10:11 )             25.8     08-20    129<L>  |  95<L>  |  15  ----------------------------<  143<H>  4.0   |  25  |  1.19    Ca    8.1<L>      20 Aug 2022 10:11  Phos  4.5     08-20  Mg     2.0     08-20    TPro  x   /  Alb  x   /  TBili  0.6  /  DBili  x   /  AST  x   /  ALT  x   /  AlkPhos  x   08-20      PT/INR - ( 20 Aug 2022 05:24 )   PT: 13.1 sec;   INR: 1.10         Urinalysis Basic - ( 20 Aug 2022 14:38 )    Color: Yellow / Appearance: Clear / S.010 / pH: x  Gluc: x / Ketone: NEGATIVE  / Bili: Negative / Urobili: 0.2 E.U./dL   Blood: x / Protein: NEGATIVE mg/dL / Nitrite: POSITIVE   Leuk Esterase: NEGATIVE / RBC: x / WBC x   Sq Epi: x / Non Sq Epi: x / Bacteria: x     MEDICATIONS  (STANDING):  acetaminophen     Tablet .. 1000 milliGRAM(s) Oral every 6 hours  famotidine Injectable 20 milliGRAM(s) IV Push daily  pantoprazole  Injectable 40 milliGRAM(s) IV Push every 24 hours  sodium chloride 0.9%. 1000 milliLiter(s) (125 mL/Hr) IV Continuous <Continuous>        A/P:  66F with pmhx of IBS who presented to the hospital in the setting of requiring ANTWON due to fibroid uterus. Pt's initial cr of 0.7,now increased to 1.19. In addition pt with decrease in Hgb from 14 to 8 following surgery.     #EUGENE likely secondary to dehydration  - Renal consult appreciated  - Will continue IVF and f/u repeat creatinine   -Avoid NSAID's (Celebrex and Toradol)  -Check renal U/S     #Acute blood loss anemia  - Will f/u repeat CBC this afternoon  - Consider CT abdomen to evaluate for possible retroperitoneal bleed   - Check iron profile and ferritin  - Continue PPI

## 2022-08-20 NOTE — CHART NOTE - NSCHARTNOTEFT_GEN_A_CORE
Pt seen at bedside. Pt states she feels well but feels fatigued.       Vitals  /75  Hr 83  Gen: NAD  ABD: soft, appropriate tenderness to palpation, mild abd distention, no rebound or guarding, negative CVA tenderness  EXT: wnl    Pt counseled and consented for 1 uPRBC and plan for renal US and CT scan w/o contrast to asses for hemoperitoneum.

## 2022-08-20 NOTE — PROGRESS NOTE ADULT - ASSESSMENT
65yo s/p ANTWON, BSO for fibroid uterus, about 24w in size. Exparel used    Neuro: Tylenol,  tramadol PRN, holding NSAIDS due to Cr rise  CV: euvolemic  Lung: RA  GI/FEN: /hr, CLD, -/-, zofran PRN, simethicone, protonix, pepcid ATC  : cortez, pyridium 200mg q8h PRN bladder spasm   VTE: SCD  Labs: noon CBC, BMP, urine lytes to reassess rise in Cr

## 2022-08-21 LAB
ALBUMIN SERPL ELPH-MCNC: 2.8 G/DL — LOW (ref 3.3–5)
ALP SERPL-CCNC: 49 U/L — SIGNIFICANT CHANGE UP (ref 40–120)
ALT FLD-CCNC: 6 U/L — LOW (ref 10–45)
ANION GAP SERPL CALC-SCNC: 4 MMOL/L — LOW (ref 5–17)
ANION GAP SERPL CALC-SCNC: 5 MMOL/L — SIGNIFICANT CHANGE UP (ref 5–17)
APTT BLD: 27.2 SEC — LOW (ref 27.5–35.5)
AST SERPL-CCNC: 11 U/L — SIGNIFICANT CHANGE UP (ref 10–40)
BASOPHILS # BLD AUTO: 0.04 K/UL — SIGNIFICANT CHANGE UP (ref 0–0.2)
BASOPHILS NFR BLD AUTO: 0.4 % — SIGNIFICANT CHANGE UP (ref 0–2)
BILIRUB SERPL-MCNC: 1.1 MG/DL — SIGNIFICANT CHANGE UP (ref 0.2–1.2)
BUN SERPL-MCNC: 10 MG/DL — SIGNIFICANT CHANGE UP (ref 7–23)
BUN SERPL-MCNC: 13 MG/DL — SIGNIFICANT CHANGE UP (ref 7–23)
CALCIUM SERPL-MCNC: 7.9 MG/DL — LOW (ref 8.4–10.5)
CALCIUM SERPL-MCNC: 8.2 MG/DL — LOW (ref 8.4–10.5)
CHLORIDE SERPL-SCNC: 108 MMOL/L — SIGNIFICANT CHANGE UP (ref 96–108)
CHLORIDE SERPL-SCNC: 108 MMOL/L — SIGNIFICANT CHANGE UP (ref 96–108)
CO2 SERPL-SCNC: 26 MMOL/L — SIGNIFICANT CHANGE UP (ref 22–31)
CO2 SERPL-SCNC: 28 MMOL/L — SIGNIFICANT CHANGE UP (ref 22–31)
CREAT SERPL-MCNC: 0.77 MG/DL — SIGNIFICANT CHANGE UP (ref 0.5–1.3)
CREAT SERPL-MCNC: 0.96 MG/DL — SIGNIFICANT CHANGE UP (ref 0.5–1.3)
EGFR: 64 ML/MIN/1.73M2 — SIGNIFICANT CHANGE UP
EGFR: 84 ML/MIN/1.73M2 — SIGNIFICANT CHANGE UP
EOSINOPHIL # BLD AUTO: 0.04 K/UL — SIGNIFICANT CHANGE UP (ref 0–0.5)
EOSINOPHIL NFR BLD AUTO: 0.4 % — SIGNIFICANT CHANGE UP (ref 0–6)
FIBRINOGEN PPP-MCNC: 379 MG/DL — SIGNIFICANT CHANGE UP (ref 258–438)
GLUCOSE SERPL-MCNC: 100 MG/DL — HIGH (ref 70–99)
GLUCOSE SERPL-MCNC: 107 MG/DL — HIGH (ref 70–99)
HCT VFR BLD CALC: 20.1 % — CRITICAL LOW (ref 34.5–45)
HCT VFR BLD CALC: 26.6 % — LOW (ref 34.5–45)
HCT VFR BLD CALC: 28.3 % — LOW (ref 34.5–45)
HGB BLD-MCNC: 7 G/DL — CRITICAL LOW (ref 11.5–15.5)
HGB BLD-MCNC: 8.8 G/DL — LOW (ref 11.5–15.5)
HGB BLD-MCNC: 9.2 G/DL — LOW (ref 11.5–15.5)
IMM GRANULOCYTES NFR BLD AUTO: 0.5 % — SIGNIFICANT CHANGE UP (ref 0–1.5)
INR BLD: 1.07 — SIGNIFICANT CHANGE UP (ref 0.88–1.16)
LYMPHOCYTES # BLD AUTO: 1.5 K/UL — SIGNIFICANT CHANGE UP (ref 1–3.3)
LYMPHOCYTES # BLD AUTO: 16.1 % — SIGNIFICANT CHANGE UP (ref 13–44)
MAGNESIUM SERPL-MCNC: 2.2 MG/DL — SIGNIFICANT CHANGE UP (ref 1.6–2.6)
MCHC RBC-ENTMCNC: 29.1 PG — SIGNIFICANT CHANGE UP (ref 27–34)
MCHC RBC-ENTMCNC: 29.6 PG — SIGNIFICANT CHANGE UP (ref 27–34)
MCHC RBC-ENTMCNC: 30.4 PG — SIGNIFICANT CHANGE UP (ref 27–34)
MCHC RBC-ENTMCNC: 32.5 GM/DL — SIGNIFICANT CHANGE UP (ref 32–36)
MCHC RBC-ENTMCNC: 33.1 GM/DL — SIGNIFICANT CHANGE UP (ref 32–36)
MCHC RBC-ENTMCNC: 34.8 GM/DL — SIGNIFICANT CHANGE UP (ref 32–36)
MCV RBC AUTO: 87.4 FL — SIGNIFICANT CHANGE UP (ref 80–100)
MCV RBC AUTO: 88.1 FL — SIGNIFICANT CHANGE UP (ref 80–100)
MCV RBC AUTO: 91 FL — SIGNIFICANT CHANGE UP (ref 80–100)
MONOCYTES # BLD AUTO: 1.04 K/UL — HIGH (ref 0–0.9)
MONOCYTES NFR BLD AUTO: 11.2 % — SIGNIFICANT CHANGE UP (ref 2–14)
NEUTROPHILS # BLD AUTO: 6.64 K/UL — SIGNIFICANT CHANGE UP (ref 1.8–7.4)
NEUTROPHILS NFR BLD AUTO: 71.4 % — SIGNIFICANT CHANGE UP (ref 43–77)
NRBC # BLD: 0 /100 WBCS — SIGNIFICANT CHANGE UP (ref 0–0)
PHOSPHATE SERPL-MCNC: 2.5 MG/DL — SIGNIFICANT CHANGE UP (ref 2.5–4.5)
PLATELET # BLD AUTO: 207 K/UL — SIGNIFICANT CHANGE UP (ref 150–400)
PLATELET # BLD AUTO: 221 K/UL — SIGNIFICANT CHANGE UP (ref 150–400)
PLATELET # BLD AUTO: 223 K/UL — SIGNIFICANT CHANGE UP (ref 150–400)
POTASSIUM SERPL-MCNC: 3.8 MMOL/L — SIGNIFICANT CHANGE UP (ref 3.5–5.3)
POTASSIUM SERPL-MCNC: 4.2 MMOL/L — SIGNIFICANT CHANGE UP (ref 3.5–5.3)
POTASSIUM SERPL-SCNC: 3.8 MMOL/L — SIGNIFICANT CHANGE UP (ref 3.5–5.3)
POTASSIUM SERPL-SCNC: 4.2 MMOL/L — SIGNIFICANT CHANGE UP (ref 3.5–5.3)
PROT SERPL-MCNC: 4.8 G/DL — LOW (ref 6–8.3)
PROTHROM AB SERPL-ACNC: 12.8 SEC — SIGNIFICANT CHANGE UP (ref 10.5–13.4)
RBC # BLD: 2.3 M/UL — LOW (ref 3.8–5.2)
RBC # BLD: 3.02 M/UL — LOW (ref 3.8–5.2)
RBC # BLD: 3.11 M/UL — LOW (ref 3.8–5.2)
RBC # FLD: 13.2 % — SIGNIFICANT CHANGE UP (ref 10.3–14.5)
RBC # FLD: 14 % — SIGNIFICANT CHANGE UP (ref 10.3–14.5)
RBC # FLD: 14.2 % — SIGNIFICANT CHANGE UP (ref 10.3–14.5)
SODIUM SERPL-SCNC: 139 MMOL/L — SIGNIFICANT CHANGE UP (ref 135–145)
SODIUM SERPL-SCNC: 140 MMOL/L — SIGNIFICANT CHANGE UP (ref 135–145)
WBC # BLD: 8.03 K/UL — SIGNIFICANT CHANGE UP (ref 3.8–10.5)
WBC # BLD: 9.21 K/UL — SIGNIFICANT CHANGE UP (ref 3.8–10.5)
WBC # BLD: 9.31 K/UL — SIGNIFICANT CHANGE UP (ref 3.8–10.5)
WBC # FLD AUTO: 8.03 K/UL — SIGNIFICANT CHANGE UP (ref 3.8–10.5)
WBC # FLD AUTO: 9.21 K/UL — SIGNIFICANT CHANGE UP (ref 3.8–10.5)
WBC # FLD AUTO: 9.31 K/UL — SIGNIFICANT CHANGE UP (ref 3.8–10.5)

## 2022-08-21 PROCEDURE — 99232 SBSQ HOSP IP/OBS MODERATE 35: CPT

## 2022-08-21 PROCEDURE — 99233 SBSQ HOSP IP/OBS HIGH 50: CPT

## 2022-08-21 PROCEDURE — 76775 US EXAM ABDO BACK WALL LIM: CPT | Mod: 26

## 2022-08-21 RX ORDER — SODIUM,POTASSIUM PHOSPHATES 278-250MG
3 POWDER IN PACKET (EA) ORAL ONCE
Refills: 0 | Status: COMPLETED | OUTPATIENT
Start: 2022-08-21 | End: 2022-08-21

## 2022-08-21 RX ORDER — IRON SUCROSE 20 MG/ML
200 INJECTION, SOLUTION INTRAVENOUS EVERY 24 HOURS
Refills: 0 | Status: DISCONTINUED | OUTPATIENT
Start: 2022-08-21 | End: 2022-08-23

## 2022-08-21 RX ORDER — FAMOTIDINE 10 MG/ML
20 INJECTION INTRAVENOUS EVERY 24 HOURS
Refills: 0 | Status: DISCONTINUED | OUTPATIENT
Start: 2022-08-21 | End: 2022-08-23

## 2022-08-21 RX ORDER — POTASSIUM CHLORIDE 20 MEQ
20 PACKET (EA) ORAL ONCE
Refills: 0 | Status: COMPLETED | OUTPATIENT
Start: 2022-08-21 | End: 2022-08-21

## 2022-08-21 RX ORDER — ACETAMINOPHEN 500 MG
1000 TABLET ORAL ONCE
Refills: 0 | Status: COMPLETED | OUTPATIENT
Start: 2022-08-21 | End: 2022-08-21

## 2022-08-21 RX ORDER — PANTOPRAZOLE SODIUM 20 MG/1
40 TABLET, DELAYED RELEASE ORAL EVERY 24 HOURS
Refills: 0 | Status: DISCONTINUED | OUTPATIENT
Start: 2022-08-21 | End: 2022-08-23

## 2022-08-21 RX ADMIN — Medication 400 MILLIGRAM(S): at 08:06

## 2022-08-21 RX ADMIN — Medication 1000 MILLIGRAM(S): at 19:10

## 2022-08-21 RX ADMIN — Medication 1000 MILLIGRAM(S): at 00:41

## 2022-08-21 RX ADMIN — Medication 1000 MILLIGRAM(S): at 15:17

## 2022-08-21 RX ADMIN — Medication 0.25 MILLIGRAM(S): at 21:41

## 2022-08-21 RX ADMIN — IRON SUCROSE 110 MILLIGRAM(S): 20 INJECTION, SOLUTION INTRAVENOUS at 14:18

## 2022-08-21 RX ADMIN — Medication 1000 MILLIGRAM(S): at 08:21

## 2022-08-21 RX ADMIN — Medication 3 TABLET(S): at 14:17

## 2022-08-21 RX ADMIN — PANTOPRAZOLE SODIUM 40 MILLIGRAM(S): 20 TABLET, DELAYED RELEASE ORAL at 14:17

## 2022-08-21 RX ADMIN — Medication 1000 MILLIGRAM(S): at 14:17

## 2022-08-21 RX ADMIN — Medication 20 MILLIEQUIVALENT(S): at 14:17

## 2022-08-21 RX ADMIN — FAMOTIDINE 20 MILLIGRAM(S): 10 INJECTION INTRAVENOUS at 14:17

## 2022-08-21 NOTE — CONSULT NOTE ADULT - ATTENDING COMMENTS
prerenal/hemodynamic EUGENE resolved, acute blood loss anemia stabilized s/p transfusions, intraabdominal hematoma with no signs of  obstruction, hypotension also improved

## 2022-08-21 NOTE — PROVIDER CONTACT NOTE (CRITICAL VALUE NOTIFICATION) - BACKGROUND
Pt is post GYN surgery. 1 unit of blood was given previously by AGAPITO Ramos. Labs were drawn afterwards to assess values.

## 2022-08-21 NOTE — CONSULT NOTE ADULT - ASSESSMENT
66F with pmhx of IBS who presented to the hospital on 8/19 in the setting of scheduled ANTWON and BSO for fibroid uterus.  Found to have rising creatinine post-op likely in the setting of shifting hemodynamics    Assessment/Plan:   #pre-renal azotemia  #Hypoperfusion  Baseline creatinine around 0.7~, creatinine peaking 1.25  Pt noted to have elevated creatinine after post-op, initial thoughts were could this be due to hypotension which could lead to an iATN or if there was any type of injury to the ureter or bladder. CT scan performed with findings of hemoperitoneum and hematoma, but no injury to structures mentioned. Given IVF and blood product and cortez placement and found to have improvement in creatinine. Likely had a component of hypoperfusion as well as compression from the blood in the abdomen.   -Trend BMP daily  -Monitor urine output  -strict I/Os   -Hemoperitoneum care as per OBGYN; they will speak with IR to see if any role for drainage of hematoma    #Anemia  s/p 3UPRBC  Iron profile from 8/20 indicative of MAK  -Consider starting IV iron sucrose 200mg x5 days    Thank you for the opportunity to participate in the care of your patient. The nephrology service remains available to assist with any questions or concerns. Please feel free to reach us by paging the on-call nephrology fellow for urgent issues or as below.     Nikia Norton D.O.  PGY 5 - Nephrology Fellow  [phone]

## 2022-08-21 NOTE — CONSULT NOTE ADULT - SUBJECTIVE AND OBJECTIVE BOX
HPI:  66F with pmhx of IBS who presented to the hospital on  in the setting of scheduled ANTWON and BSO for fibroid uterus. On  noted to have elevation in creatinine from baseline of 0.7 which subsequently carlos to 1.25. During this time it was noted patient was hypotensive to the 90's. Her urine output remained adequate and patient overall states she was comfortable. CT scan was obtained with revealed hemoperitoneum in 4 quadrants with 14cm anterior hematoma. Urology was called for possible stent placement and placed a cortez. After placement of cortez, creatinine has now started to improve to 0.76. The patient is s/p 3 UPRBC this AM with hemoglobin response from 7.2 to 9.2. BP remains borderline. Nephrology initially consulted for worsening EUGENE that is now resolved.    Obhx: denies  Gynhx: uterine fibroids  PMH: IBS (C), hx diverticulitis  PSH: wrist surgery   meds: zirtec prn  all: pen (childhood, rash)    PHYSICAL EXAM:   Vital Signs Last 24 Hrs  T(C): 36.7 (18 Aug 2022 10:24), Max: 36.7 (18 Aug 2022 10:24)  T(F): 98 (18 Aug 2022 10:24), Max: 98 (18 Aug 2022 10:24)  HR: 78 (18 Aug 2022 10:24) (78 - 78)  BP: 145/87 (18 Aug 2022 10:24) (145/87 - 145/87)  BP(mean): --  RR: 16 (18 Aug 2022 10:24) (16 - 16)  SpO2: 100% (18 Aug 2022 10:24) (100% - 100%)      LABS:      RADIOLOGY & ADDITIONAL STUDIES: (19 Aug 2022 07:09)      PAST MEDICAL & SURGICAL HISTORY:  History of IBS      History of diverticulitis      H/O hiatal hernia      H/O fracture of wrist  LEFT      Allergies:  penicillin (Rash)      Home Medications:   acetaminophen     Tablet .. 1000 milliGRAM(s) Oral every 6 hours  acetaminophen     Tablet .. 1000 milliGRAM(s) Oral every 6 hours PRN  ALPRAZolam 0.25 milliGRAM(s) Oral at bedtime PRN  famotidine Injectable 20 milliGRAM(s) IV Push daily  HYDROmorphone  Injectable 0.5 milliGRAM(s) IV Push every 15 minutes PRN  pantoprazole  Injectable 40 milliGRAM(s) IV Push every 24 hours  phenazopyridine 200 milliGRAM(s) Oral every 8 hours PRN  potassium chloride    Tablet ER 20 milliEquivalent(s) Oral once  potassium phosphate / sodium phosphate Tablet (K-PHOS No. 2) 3 Tablet(s) Oral once  simethicone 80 milliGRAM(s) Chew every 6 hours PRN  traMADol 25 milliGRAM(s) Oral every 6 hours PRN      Hospital Medications:   MEDICATIONS  (STANDING):  acetaminophen     Tablet .. 1000 milliGRAM(s) Oral every 6 hours  famotidine Injectable 20 milliGRAM(s) IV Push daily  pantoprazole  Injectable 40 milliGRAM(s) IV Push every 24 hours  potassium chloride    Tablet ER 20 milliEquivalent(s) Oral once  potassium phosphate / sodium phosphate Tablet (K-PHOS No. 2) 3 Tablet(s) Oral once      SOCIAL HISTORY:  Denies ETOh, Smoking    Family History:  FAMILY HISTORY:  non-contributory    VITALS:  T(F): 98.3 (22 @ 08:00), Max: 98.6 (22 @ 20:14)  HR: 88 (22 @ 08:00)  BP: 101/61 (22 @ 08:00)  RR: 18 (22 @ 08:00)  SpO2: 95% (22 @ 05:20)  Wt(kg): --     @ :  -   @ 07:00  --------------------------------------------------------  IN: 2175 mL / OUT: 3030 mL / NET: -855 mL     @ 07:  -   @ 11:58  --------------------------------------------------------  IN: 0 mL / OUT: 625 mL / NET: -625 mL        CAPILLARY BLOOD GLUCOSE    Review of Systems:  ROS negative except as per HPI    PHYSICAL EXAM:  GENERAL: Alert, awake, oriented x3   HEENT: BEULAH, EOMI, neck supple, no JVP  CHEST/LUNG: Bilateral clear breath sounds  HEART: Regular rate and rhythm, no murmur, no gallops, no rub   ABDOMEN: Soft, minimally tender over surgical area with mild distension  : No flank or supra pubic tenderness.  EXTREMITIES: no pedal edema  Neurology: AAOx3, no focal neurological deficit  SKIN: No rash or skin lesion     LABS:      140  |  108  |  10  ----------------------------<  100<H>  3.8   |  28  |  0.77    Ca    8.2<L>      21 Aug 2022 10:45  Phos  2.5       Mg     2.2         TPro  4.8<L>  /  Alb  2.8<L>  /  TBili  1.1  /  DBili      /  AST  11  /  ALT  6<L>  /  AlkPhos  49      Creatinine Trend: 0.77 <--, 0.96 <--, 1.24 <--, 1.19 <--, 1.15 <--                        9.2    9.21  )-----------( 207      ( 21 Aug 2022 10:45 )             28.3     Urine Studies:  Urinalysis Basic - ( 20 Aug 2022 14:38 )    Color: Yellow / Appearance: Clear / S.010 / pH:   Gluc:  / Ketone: NEGATIVE  / Bili: Negative / Urobili: 0.2 E.U./dL   Blood:  / Protein: NEGATIVE mg/dL / Nitrite: POSITIVE   Leuk Esterase: NEGATIVE / RBC: < 5 /HPF / WBC < 5 /HPF   Sq Epi:  / Non Sq Epi: 0-5 /HPF / Bacteria: Present /HPF      Creatinine, Random Urine: 151 mg/dL ( @ 11:47)  Osmolality, Random Urine: 347 mosm/kg ( @ 11:47)  Sodium, Random Urine: <20 mmol/L ( @ 11:47)           normal balance

## 2022-08-21 NOTE — CONSULT NOTE ADULT - SUBJECTIVE AND OBJECTIVE BOX
Patient was seen and examined at bedside. Events overnight noted. Pt denied having any dizziness, lightheadness or back pain.      OBJECTIVE:  Vital Signs Last 24 Hrs  T(C): 36.8 (21 Aug 2022 08:00), Max: 37 (20 Aug 2022 20:14)  T(F): 98.3 (21 Aug 2022 08:00), Max: 98.6 (20 Aug 2022 20:14)  HR: 88 (21 Aug 2022 08:00) (83 - 100)  BP: 101/61 (21 Aug 2022 08:00) (90/51 - 119/75)  BP(mean): --  RR: 18 (21 Aug 2022 08:00) (16 - 18)  SpO2: 95% (21 Aug 2022 05:20) (95% - 97%)    Parameters below as of 21 Aug 2022 05:20  Patient On (Oxygen Delivery Method): room air    PHYSICAL EXAM:  Gen: NAD laying in bed; family at bedside   CV: RRR, +S1/S2, no mumur  Pulm: CTA b/l no wheezing or crackles   Abd: soft, NTND + BS no rebound or guarding       LABS:                        9.2    9.21  )-----------( 207      ( 21 Aug 2022 10:45 )             28.3     08-21    140  |  108  |  10  ----------------------------<  100<H>  3.8   |  28  |  0.77    Ca    8.2<L>      21 Aug 2022 10:45  Phos  2.5     08-21  Mg     2.2     08-21    TPro  4.8<L>  /  Alb  2.8<L>  /  TBili  1.1  /  DBili  x   /  AST  11  /  ALT  6<L>  /  AlkPhos  49  08-21    LIVER FUNCTIONS - ( 21 Aug 2022 03:16 )  Alb: 2.8 g/dL / Pro: 4.8 g/dL / ALK PHOS: 49 U/L / ALT: 6 U/L / AST: 11 U/L / GGT: x           PT/INR - ( 21 Aug 2022 03:18 )   PT: 12.8 sec;   INR: 1.07          PTT - ( 21 Aug 2022 03:18 )  PTT:27.2 sec    CT abd/pelvis: Large volume of the hemoperitoneum with a hematoma within the anterior   lower pelvis adjacent region of active hemorrhage at the vaginal cuff.    Renal U/S: Small volume of perihepatic fluid.Bilateral renal cysts.      MEDICATIONS  (STANDING):  acetaminophen     Tablet .. 1000 milliGRAM(s) Oral every 6 hours  famotidine    Tablet 20 milliGRAM(s) Oral every 24 hours  iron sucrose IVPB 200 milliGRAM(s) IV Intermittent every 24 hours  pantoprazole    Tablet 40 milliGRAM(s) Oral every 24 hours  potassium chloride    Tablet ER 20 milliEquivalent(s) Oral once  potassium phosphate / sodium phosphate Tablet (K-PHOS No. 2) 3 Tablet(s) Oral once    A/P:66F with pmhx of IBS who presented to the hospital in the setting of requiring ANTWON due to fibroid uterus. Medicine was consult for increased creatinine now WNL     #EUGENE likely secondary to dehydration  - Renal following   - Pt's creatinine improved s/p IVF   -Will continue to monitor creatinine   -Avoid NSAID's (Celebrex and Toradol)    #Acute blood loss anemia secondary to hemoperitoneum with a hematoma   - Pt s/p transfusion of 3 units of PRBCs with improved Hgb   - Continue to monitor serial CBCs    - Continue IV Iron   - Continue PPI   -Will f/u IR regarding plan for possible drainage of hematoma.     #DISPO  - As per GYN team

## 2022-08-21 NOTE — CHART NOTE - NSCHARTNOTEFT_GEN_A_CORE
Pt evaluated at bedside after Hgb 7.0. Pt states she feels well and had no complaints at this time. Pt appears well with good skin color. Stark catheter draining copious clear urine. Pt denies SOB, CP, palpitations, HA, N/V.     PE: GEN: NAD  ABD: soft, mildly tender to palpation near incision, moderately distended, no rebound or guarding, no CVA tenderness b/l  EXT: wnl, non tender    Plan draw stat CBC, CMP, COAGS, fibrinogen  additional PRBC unit ordered to start immediately

## 2022-08-21 NOTE — PROGRESS NOTE ADULT - ASSESSMENT
65yo POD2 s/p ANTWON, BSO for fibroid uterus, about 24w in size c/b acute blood loss anemia and 14cm abdominal hematoma as well as pre-renal EUGENE.     Neuro: Tylenol/toradol atc, tramadol PRN  CV: hypovolemic, prerenal EUGENE  Lung: RA  GI/FEN: /hr, NPO, +/-, zofran PRN, simethicone, protonix, pepcid ATC  : Cortez. pyridium 200mg q8h PRN bladder spasm. Pre-renal EUGENE, FeNa 00.1- retroperitoneal US ordered  HEME: anemia H 8.8>8.4>(1L bolus)>7.3>(1U PRBC)>7.0>7.6>(2U PRBC)  VTE: SCDs    #Acute blood loss anemia  - S/p 3U PRBC  - F/u 10 posttransfusion CBC + BMP/Mg/Phos. Replete PRN  - VSS  - Holding lovenox  - IR consult for management of hematoma  - NPO/IVF for possible IR procedure/OR    #Pre-renal EUGENE  - Retroperitoneal ultrasound ordered  - F/u urine culture  - Cont cortez, strict I&Os  - Appreciate nephrology recs  - Appreciate urology recs  - Appreciate medicine recs    - D/w dr. Montague, GYN Attending

## 2022-08-21 NOTE — PROVIDER CONTACT NOTE (CRITICAL VALUE NOTIFICATION) - SITUATION
CBC sent down to lab at 2AM. Hematology called at 2:18 AM to report low hemoglobin and hematocrit levels.

## 2022-08-22 LAB
ANION GAP SERPL CALC-SCNC: 7 MMOL/L — SIGNIFICANT CHANGE UP (ref 5–17)
APTT BLD: 29.7 SEC — SIGNIFICANT CHANGE UP (ref 27.5–35.5)
BASOPHILS # BLD AUTO: 0.03 K/UL — SIGNIFICANT CHANGE UP (ref 0–0.2)
BASOPHILS NFR BLD AUTO: 0.4 % — SIGNIFICANT CHANGE UP (ref 0–2)
BLD GP AB SCN SERPL QL: NEGATIVE — SIGNIFICANT CHANGE UP
BUN SERPL-MCNC: 9 MG/DL — SIGNIFICANT CHANGE UP (ref 7–23)
CALCIUM SERPL-MCNC: 8.4 MG/DL — SIGNIFICANT CHANGE UP (ref 8.4–10.5)
CHLORIDE SERPL-SCNC: 108 MMOL/L — SIGNIFICANT CHANGE UP (ref 96–108)
CO2 SERPL-SCNC: 28 MMOL/L — SIGNIFICANT CHANGE UP (ref 22–31)
CREAT SERPL-MCNC: 0.62 MG/DL — SIGNIFICANT CHANGE UP (ref 0.5–1.3)
CULTURE RESULTS: NO GROWTH — SIGNIFICANT CHANGE UP
EGFR: 97 ML/MIN/1.73M2 — SIGNIFICANT CHANGE UP
EOSINOPHIL # BLD AUTO: 0.11 K/UL — SIGNIFICANT CHANGE UP (ref 0–0.5)
EOSINOPHIL NFR BLD AUTO: 1.5 % — SIGNIFICANT CHANGE UP (ref 0–6)
FIBRINOGEN PPP-MCNC: 404 MG/DL — SIGNIFICANT CHANGE UP (ref 258–438)
GLUCOSE SERPL-MCNC: 112 MG/DL — HIGH (ref 70–99)
HCT VFR BLD CALC: 24 % — LOW (ref 34.5–45)
HCT VFR BLD CALC: 24.2 % — LOW (ref 34.5–45)
HCT VFR BLD CALC: 25.6 % — LOW (ref 34.5–45)
HGB BLD-MCNC: 7.9 G/DL — LOW (ref 11.5–15.5)
HGB BLD-MCNC: 8 G/DL — LOW (ref 11.5–15.5)
HGB BLD-MCNC: 8.4 G/DL — LOW (ref 11.5–15.5)
IMM GRANULOCYTES NFR BLD AUTO: 0.4 % — SIGNIFICANT CHANGE UP (ref 0–1.5)
INR BLD: 1 — SIGNIFICANT CHANGE UP (ref 0.88–1.16)
LYMPHOCYTES # BLD AUTO: 2 K/UL — SIGNIFICANT CHANGE UP (ref 1–3.3)
LYMPHOCYTES # BLD AUTO: 27.3 % — SIGNIFICANT CHANGE UP (ref 13–44)
MAGNESIUM SERPL-MCNC: 1.9 MG/DL — SIGNIFICANT CHANGE UP (ref 1.6–2.6)
MCHC RBC-ENTMCNC: 29.4 PG — SIGNIFICANT CHANGE UP (ref 27–34)
MCHC RBC-ENTMCNC: 29.6 PG — SIGNIFICANT CHANGE UP (ref 27–34)
MCHC RBC-ENTMCNC: 29.7 PG — SIGNIFICANT CHANGE UP (ref 27–34)
MCHC RBC-ENTMCNC: 32.8 GM/DL — SIGNIFICANT CHANGE UP (ref 32–36)
MCHC RBC-ENTMCNC: 32.9 GM/DL — SIGNIFICANT CHANGE UP (ref 32–36)
MCHC RBC-ENTMCNC: 33.1 GM/DL — SIGNIFICANT CHANGE UP (ref 32–36)
MCV RBC AUTO: 89.2 FL — SIGNIFICANT CHANGE UP (ref 80–100)
MCV RBC AUTO: 90 FL — SIGNIFICANT CHANGE UP (ref 80–100)
MCV RBC AUTO: 90.1 FL — SIGNIFICANT CHANGE UP (ref 80–100)
MONOCYTES # BLD AUTO: 0.61 K/UL — SIGNIFICANT CHANGE UP (ref 0–0.9)
MONOCYTES NFR BLD AUTO: 8.3 % — SIGNIFICANT CHANGE UP (ref 2–14)
NEUTROPHILS # BLD AUTO: 4.54 K/UL — SIGNIFICANT CHANGE UP (ref 1.8–7.4)
NEUTROPHILS NFR BLD AUTO: 62.1 % — SIGNIFICANT CHANGE UP (ref 43–77)
NRBC # BLD: 0 /100 WBCS — SIGNIFICANT CHANGE UP (ref 0–0)
PHOSPHATE SERPL-MCNC: 2.7 MG/DL — SIGNIFICANT CHANGE UP (ref 2.5–4.5)
PLATELET # BLD AUTO: 199 K/UL — SIGNIFICANT CHANGE UP (ref 150–400)
PLATELET # BLD AUTO: 201 K/UL — SIGNIFICANT CHANGE UP (ref 150–400)
PLATELET # BLD AUTO: 236 K/UL — SIGNIFICANT CHANGE UP (ref 150–400)
POTASSIUM SERPL-MCNC: 3.9 MMOL/L — SIGNIFICANT CHANGE UP (ref 3.5–5.3)
POTASSIUM SERPL-SCNC: 3.9 MMOL/L — SIGNIFICANT CHANGE UP (ref 3.5–5.3)
PROTHROM AB SERPL-ACNC: 11.9 SEC — SIGNIFICANT CHANGE UP (ref 10.5–13.4)
RBC # BLD: 2.69 M/UL — LOW (ref 3.8–5.2)
RBC # BLD: 2.69 M/UL — LOW (ref 3.8–5.2)
RBC # BLD: 2.84 M/UL — LOW (ref 3.8–5.2)
RBC # FLD: 14.5 % — SIGNIFICANT CHANGE UP (ref 10.3–14.5)
RBC # FLD: 14.6 % — HIGH (ref 10.3–14.5)
RBC # FLD: 14.6 % — HIGH (ref 10.3–14.5)
RH IG SCN BLD-IMP: POSITIVE — SIGNIFICANT CHANGE UP
SARS-COV-2 RNA SPEC QL NAA+PROBE: SIGNIFICANT CHANGE UP
SODIUM SERPL-SCNC: 143 MMOL/L — SIGNIFICANT CHANGE UP (ref 135–145)
SPECIMEN SOURCE: SIGNIFICANT CHANGE UP
WBC # BLD: 7.32 K/UL — SIGNIFICANT CHANGE UP (ref 3.8–10.5)
WBC # BLD: 7.58 K/UL — SIGNIFICANT CHANGE UP (ref 3.8–10.5)
WBC # BLD: 8.28 K/UL — SIGNIFICANT CHANGE UP (ref 3.8–10.5)
WBC # FLD AUTO: 7.32 K/UL — SIGNIFICANT CHANGE UP (ref 3.8–10.5)
WBC # FLD AUTO: 7.58 K/UL — SIGNIFICANT CHANGE UP (ref 3.8–10.5)
WBC # FLD AUTO: 8.28 K/UL — SIGNIFICANT CHANGE UP (ref 3.8–10.5)

## 2022-08-22 PROCEDURE — 99233 SBSQ HOSP IP/OBS HIGH 50: CPT

## 2022-08-22 PROCEDURE — 99232 SBSQ HOSP IP/OBS MODERATE 35: CPT | Mod: GC

## 2022-08-22 PROCEDURE — 74174 CTA ABD&PLVS W/CONTRAST: CPT | Mod: 26

## 2022-08-22 RX ORDER — DIATRIZOATE MEGLUMINE 180 MG/ML
30 INJECTION, SOLUTION INTRAVESICAL ONCE
Refills: 0 | Status: DISCONTINUED | OUTPATIENT
Start: 2022-08-22 | End: 2022-08-22

## 2022-08-22 RX ORDER — SODIUM CHLORIDE 9 MG/ML
1000 INJECTION, SOLUTION INTRAVENOUS
Refills: 0 | Status: DISCONTINUED | OUTPATIENT
Start: 2022-08-22 | End: 2022-08-22

## 2022-08-22 RX ORDER — IOHEXOL 300 MG/ML
30 INJECTION, SOLUTION INTRAVENOUS ONCE
Refills: 0 | Status: DISCONTINUED | OUTPATIENT
Start: 2022-08-22 | End: 2022-08-22

## 2022-08-22 RX ADMIN — Medication 1000 MILLIGRAM(S): at 08:28

## 2022-08-22 RX ADMIN — IRON SUCROSE 110 MILLIGRAM(S): 20 INJECTION, SOLUTION INTRAVENOUS at 14:32

## 2022-08-22 RX ADMIN — Medication 1000 MILLIGRAM(S): at 17:48

## 2022-08-22 RX ADMIN — Medication 0.25 MILLIGRAM(S): at 22:14

## 2022-08-22 RX ADMIN — Medication 1000 MILLIGRAM(S): at 18:41

## 2022-08-22 RX ADMIN — Medication 1000 MILLIGRAM(S): at 07:28

## 2022-08-22 RX ADMIN — Medication 1000 MILLIGRAM(S): at 03:36

## 2022-08-22 RX ADMIN — Medication 1000 MILLIGRAM(S): at 02:36

## 2022-08-22 RX ADMIN — SODIUM CHLORIDE 110 MILLILITER(S): 9 INJECTION, SOLUTION INTRAVENOUS at 11:41

## 2022-08-22 NOTE — PROGRESS NOTE ADULT - ASSESSMENT
66 y.o. POD#3 s/p ANTWON/BSO for fibroid uterus, about 24w in size c/b acute blood loss anemia and  large hemo as well as pre-renal EUGENE.     Neuro: Tylenol/toradol atc, tramadol PRN  CV: hypovolemic, prerenal EUGENE  Lung: RA  GI/FEN: /hr, NPO, +/-, zofran PRN, simethicone, protonix, pepcid ATC  : Pyridium 200mg q8h PRN bladder spasm. Pre-renal EUGENE, FeNa 00.1- retroperitoneal US ordered  HEME: anemia H 8.8>8.4>(1L bolus)>7.3>(1U PRBC)>7.0>7.6>(2U PRBC)>9.2>8.8  VTE: SCDs    #Acute blood loss anemia  - S/p 3U PRBC  - F/u 10 posttransfusion CBC + BMP/Mg/Phos. Replete PRN  - VSS  - Consider lovenox      #Pre-renal EUGENE  - Retroperitoneal ultrasound ordered  - F/u urine culture  - Voiding  - Appreciate nephrology recs  - Appreciate urology recs  - Appreciate medicine recs    - D/w dr. Montague, GYN Attending

## 2022-08-22 NOTE — CONSULT NOTE ADULT - SUBJECTIVE AND OBJECTIVE BOX
ID: This is a 66-yo female with IBS and fibroids now on POD3 following total abdominal hysterectomy (ANTWON) on 19 Aug 2022 for whom surgery was consulted for postoperative bleeding.    HPI:   - Main symptom: faintness, slight abdominal discomfort  - Notes mild abdominal pain, improving in severity over past 3 days.   - Had 1U pRBC on 8/20 and 2 U pRBC on 8/21.   - After responding fairly appropriately to above transfusions, H&H stable today at 8.0 / 24.2 from 7.9 / 24.0. SHe has been afebrile, non-tachycardic and normotensive (90s/50s -- baseline for her, by report) during this interval.   - Prior work-up: CT-Angiogram A/P without active extravasation, known pelvic hematoma, some intraabdominal fluid.  - PMH: IBS, Fibroids  - PSH: No prior surgeries excepting fixation of wrist fracture.    PAST MEDICAL & SURGICAL HISTORY:  History of IBS      History of diverticulitis      H/O hiatal hernia      H/O fracture of wrist  LEFT          General: no F/C  Neuro: No seizures, focal neurologic symptoms  Psych: No mood changes  CV: No CP, SOB  Pulm: No SOB, coughing  GI: No N/V, abdominal pain. No diarrhea.   : No dysuria  Heme: No weakness, easy bruising.  Skin: No rashes  Lymph: No swelling    MEDICATIONS  (STANDING):  acetaminophen     Tablet .. 1000 milliGRAM(s) Oral every 6 hours  famotidine    Tablet 20 milliGRAM(s) Oral every 24 hours  iron sucrose IVPB 200 milliGRAM(s) IV Intermittent every 24 hours  lactated ringers. 1000 milliLiter(s) (110 mL/Hr) IV Continuous <Continuous>  pantoprazole    Tablet 40 milliGRAM(s) Oral every 24 hours    MEDICATIONS  (PRN):  acetaminophen     Tablet .. 1000 milliGRAM(s) Oral every 6 hours PRN Mild Pain (1 - 3)  ALPRAZolam 0.25 milliGRAM(s) Oral at bedtime PRN anxiety  HYDROmorphone  Injectable 0.5 milliGRAM(s) IV Push every 15 minutes PRN Severe Pain (7 - 10)  phenazopyridine 200 milliGRAM(s) Oral every 8 hours PRN bladder spasm  simethicone 80 milliGRAM(s) Chew every 6 hours PRN Gas  traMADol 25 milliGRAM(s) Oral every 6 hours PRN Moderate Pain (4 - 6)      Allergies    penicillin (Rash)    Intolerances        SOCIAL HISTORY:    FAMILY HISTORY:      Vital Signs Last 24 Hrs  T(C): 36.9 (22 Aug 2022 15:27), Max: 36.9 (21 Aug 2022 20:40)  T(F): 98.5 (22 Aug 2022 15:27), Max: 98.5 (21 Aug 2022 20:40)  HR: 77 (22 Aug 2022 15:27) (59 - 93)  BP: 108/65 (22 Aug 2022 15:27) (93/59 - 108/65)  BP(mean): --  RR: 17 (22 Aug 2022 15:27) (16 - 17)  SpO2: 95% (22 Aug 2022 15:27) (95% - 98%)    Parameters below as of 22 Aug 2022 15:27  Patient On (Oxygen Delivery Method): room air      Physical Examination    Gen: Non-acutely-ill-appearing 65yo female in NAD  Neurologic: AAOx3  Cardiac: Normal rate, regular rhythm  Vascular: Palpable pulses in PT / DP bilaterally  Pulm: Breathing comfortably  Abd: Soft, non-distended; mild ecchymosis on anterior abdominal wall. Mild fullness and mild TTP.   Incision: Well approximated without erythema/edema/induration.   : No Stark  Skin: No rashes  Extremities: No edema.  Psychiatric: Interacting normally    LABS:                        8.4    8.28  )-----------( 236      ( 22 Aug 2022 15:34 )             25.6     08-22    143  |  108  |  9   ----------------------------<  112<H>  3.9   |  28  |  0.62    Ca    8.4      22 Aug 2022 09:07  Phos  2.7     08-22  Mg     1.9     08-22    TPro  4.8<L>  /  Alb  2.8<L>  /  TBili  1.1  /  DBili  x   /  AST  11  /  ALT  6<L>  /  AlkPhos  49  08-21    PT/INR - ( 22 Aug 2022 09:07 )   PT: 11.9 sec;   INR: 1.00          PTT - ( 22 Aug 2022 09:07 )  PTT:29.7 sec    CT-Angio A/P (8/22/22):  IMPRESSION:    1. Since 8/20/2022, patient is again status post ANTWON-BSO with interval decrease in size of large hemoperitoneum. There is no active site of hemorrhage/bleed identified. No contrast extravasation/intravasation..  2. Small bilateral pleural effusions.

## 2022-08-22 NOTE — CONSULT NOTE ADULT - SUBJECTIVE AND OBJECTIVE BOX
66F who presented on 8/19 for scheduled ANTWON/BSO for fibroid uterus. EBL was 50 cc per op note. On POD#1, patient noted to have Hgb 8.4 from 8.8; preop Hgb was 15. Course complicated by acute blood loss anemia with CT findings of hemoperitoneum and 5.4 x 6.7 cm hematoma in anterior lower pelvis. Patient received total of 3 units of PRBCs on POD#1 and POD#2. IR was consulted over the weekend for drainage of hematoma with no plan for IR intervention at the time. Today on 8/22, Hgb 7.9 from 8.8. IR consulted again today for concern of active bleed and need for possible embolization. Hgb repeat 8.0 from 7.9. CTA abdomen/pelvis completed. Patient is hemodynamically stable. Patient is NPO after midnight. Patient is able to consent for herself. Last COVID test is beyond 72 hr.      Clinical History: Z90.722    Handoff    MEWS Score    History of IBS    History of diverticulitis    H/O hiatal hernia    Fibroid uterus    Fibroid uterus    EUGENE (acute kidney injury)    Hysterectomy, total, abdominal, with BSO    H/O fracture of wrist    [id number]        Meds:acetaminophen     Tablet .. 1000 milliGRAM(s) Oral every 6 hours  acetaminophen     Tablet .. 1000 milliGRAM(s) Oral every 6 hours PRN  ALPRAZolam 0.25 milliGRAM(s) Oral at bedtime PRN  famotidine    Tablet 20 milliGRAM(s) Oral every 24 hours  HYDROmorphone  Injectable 0.5 milliGRAM(s) IV Push every 15 minutes PRN  iron sucrose IVPB 200 milliGRAM(s) IV Intermittent every 24 hours  lactated ringers. 1000 milliLiter(s) IV Continuous <Continuous>  pantoprazole    Tablet 40 milliGRAM(s) Oral every 24 hours  phenazopyridine 200 milliGRAM(s) Oral every 8 hours PRN  simethicone 80 milliGRAM(s) Chew every 6 hours PRN  traMADol 25 milliGRAM(s) Oral every 6 hours PRN      Allergies:penicillin (Rash)        Labs:                           8.0    7.58  )-----------( 201      ( 22 Aug 2022 09:07 )             24.2     PT/INR - ( 22 Aug 2022 09:07 )   PT: 11.9 sec;   INR: 1.00          PTT - ( 22 Aug 2022 09:07 )  PTT:29.7 sec  08-22    143  |  108  |  9   ----------------------------<  112<H>  3.9   |  28  |  0.62    Ca    8.4      22 Aug 2022 09:07  Phos  2.7     08-22  Mg     1.9     08-22    TPro  4.8<L>  /  Alb  2.8<L>  /  TBili  1.1  /  DBili  x   /  AST  11  /  ALT  6<L>  /  AlkPhos  49  08-21          Imaging Findings:    CT abdomen/pelvis 8/20/22  IMPRESSION:  Large volume of the hemoperitoneum with a hematoma within the anterior lower pelvis adjacent region of active hemorrhage at the vaginal cuff.

## 2022-08-22 NOTE — PROGRESS NOTE ADULT - ASSESSMENT
:66F with pmhx of IBS who presented to the hospital in the setting of requiring ANTWON due to fibroid uterus. Medicine was consult for increased creatinine now WNL     #Anemia   2/2 acute blood loss from ANTWON c/b by hemoperitoneum  - Pt s/p transfusion of 3 units of PRBCs with improved Hgb w/ a subsequent 1unit Hb drop that stabilized on AM labs   - Continue to monitor serial CBCs    - Continue IV Iron   - Continue PPI   -Will f/u IR regarding plan for possible drainage of hematoma.     #EUGENE   [Resolved]   - Pt's creatinine improved s/p IVF and transfusion  - Cr now WNL  -Avoid nephrotoxic agents     #Abnormal mole  - noted on physical exam approx 2cmx1.5cm w/ irregular borders  - patient aware of this mole and understands she needs dermatology f/u  - f/u outpatient dermatology    Case d/w attending Medicine will sign off please re-consult as needed. 66F with pmhx of IBS who presented to the hospital in the setting of requiring ANTWON due to fibroid uterus. Medicine was consult for increased creatinine now WNL     #Anemia   2/2 acute blood loss from ANTWON c/b by hemoperitoneum  - Pt s/p transfusion of 3 units of PRBCs with improved Hgb w/ a subsequent 1unit Hb drop that stabilized on AM labs   - Continue to monitor serial CBCs    - Continue IV Iron   - Continue PPI   -Will f/u IR regarding plan for possible drainage of hematoma.     #EUGENE   [Resolved]   - Pt's creatinine improved s/p IVF and transfusion  - Cr now WNL  -Avoid nephrotoxic agents     #Abnormal mole  - noted on physical exam approx 2cmx1.5cm w/ irregular borders  - patient aware of this mole and understands she needs dermatology f/u  - f/u outpatient dermatology    Case d/w attending Medicine will sign off please re-consult as needed.

## 2022-08-22 NOTE — CONSULT NOTE ADULT - ASSESSMENT
This is a 66-yo female with IBS and fibroids now on POD3 following total abdominal hysterectomy (ANTWON) on 19 Aug 2022 for whom surgery was consulted for postoperative bleeding.    Thinking: Likely stabilized intraabdominal bleeding. Reassuring against ongoing intraabdominal hemorrhage are CT-Angio A/P without active extravasation, normal hemodynamic parameters and stabilizing Hb/Hct trend. Similarly, she is essentially asymptomatic and does not endorse symptoms of orthostatic changes.      - Continue to trend Hb&Hct regularly  - Would attend to UOP strictly, attempting to keep UOP > 0.5 CC/kg/hr (Currently as charted ~ 0.34 CC/kg/hr).   - Monitor hemodynamics closely.     INCOMPLETE This is a 66-yo female with IBS and fibroids now on POD3 following total abdominal hysterectomy (ANTWON) on 19 Aug 2022 for whom surgery was consulted for postoperative bleeding.    Thinking: Likely stabilized intraabdominal bleeding. Reassuring against ongoing intraabdominal hemorrhage are CT-Angio A/P without active extravasation, normal hemodynamic parameters and stabilizing Hb/Hct trend. Similarly, she is essentially asymptomatic and does not endorse symptoms of orthostatic changes.      - Continue to trend Hb&Hct regularly  - Would attend to UOP strictly, attempting to keep UOP > 0.5 CC/kg/hr (Currently as charted ~ 0.34 CC/kg/hr).   - Monitor hemodynamics closely.

## 2022-08-22 NOTE — CONSULT NOTE ADULT - ASSESSMENT
Assessment: 66F who presented on 8/19 for scheduled ANTWON/BSO for fibroid uterus. Postoperative course complicated by acute blood loss anemia requiring transfusion of 3 units PRBCs with CT findings of pelvic hematoma. CTA abdomen/pelvis prelim reviewed by IR. CTA with redemonstrated pelvic hematoma that appears to have slight interval decrease in size; there is again seen higher attenuation at vaginal cuff but with no contrast pooling to suggest active bleeding. No plan for IR intervention is indicated at this time. Case discussed with Dr. Dorman.        Recommendations: management per primary team        Communicated with: primary GYN team

## 2022-08-22 NOTE — CONSULT NOTE ADULT - CONSULT REASON
Intraabdominal bleeding
Acute renal failure
EUGENE
EUGENE
Consult for ureteral stents
Possible embolization for suspected pelvic bleed. Patient is s/p ANTWON/BSO POD#3.

## 2022-08-22 NOTE — PROGRESS NOTE ADULT - ASSESSMENT
66F with pmhx of IBS who presented to the hospital on 8/19 in the setting of scheduled ANTWON and BSO for fibroid uterus. Found to have rising creatinine post-op likely in the setting of shifting hemodynamics    Assessment/Plan:   #pre-renal azotemia  #Hypoperfusion  Baseline creatinine around 0.7~. EUGENE now resolved.   Pt noted to have elevated creatinine after post-op, initial thoughts were could this be due to hypotension (pt has had soft BP's) which could lead to an iATN or if there was any type of injury to the ureter or bladder. CT scan performed with findings of hemoperitoneum and hematoma, but no injury to structures mentioned. Likely had a component of hypoperfusion as well as compression from the blood in the abdomen.   -Trend BMP daily  -Monitor urine output  -Hemoperitoneum care as per OBGYN    #Anemia  s/p 3UPRBC  Iron profile from 8/20 indicative of MAK  -started on IV iron sucrose 200mg x5 days    Recommendations not final until signed by Attending.  Thank you for the opportunity to participate in the care of your patient. The nephrology service remains available to assist with any questions or concerns. Please feel free to reach us by paging the on-call nephrology fellow for urgent issues or as below.

## 2022-08-23 VITALS
SYSTOLIC BLOOD PRESSURE: 120 MMHG | RESPIRATION RATE: 17 BRPM | OXYGEN SATURATION: 96 % | TEMPERATURE: 98 F | DIASTOLIC BLOOD PRESSURE: 72 MMHG | HEART RATE: 68 BPM

## 2022-08-23 LAB
ANION GAP SERPL CALC-SCNC: 1 MMOL/L — LOW (ref 5–17)
BUN SERPL-MCNC: 10 MG/DL — SIGNIFICANT CHANGE UP (ref 7–23)
CALCIUM SERPL-MCNC: 8.6 MG/DL — SIGNIFICANT CHANGE UP (ref 8.4–10.5)
CHLORIDE SERPL-SCNC: 104 MMOL/L — SIGNIFICANT CHANGE UP (ref 96–108)
CO2 SERPL-SCNC: 33 MMOL/L — HIGH (ref 22–31)
CREAT SERPL-MCNC: 0.63 MG/DL — SIGNIFICANT CHANGE UP (ref 0.5–1.3)
EGFR: 97 ML/MIN/1.73M2 — SIGNIFICANT CHANGE UP
GLUCOSE SERPL-MCNC: 98 MG/DL — SIGNIFICANT CHANGE UP (ref 70–99)
HCT VFR BLD CALC: 26.2 % — LOW (ref 34.5–45)
HGB BLD-MCNC: 8.6 G/DL — LOW (ref 11.5–15.5)
MAGNESIUM SERPL-MCNC: 1.9 MG/DL — SIGNIFICANT CHANGE UP (ref 1.6–2.6)
MCHC RBC-ENTMCNC: 30.3 PG — SIGNIFICANT CHANGE UP (ref 27–34)
MCHC RBC-ENTMCNC: 32.8 GM/DL — SIGNIFICANT CHANGE UP (ref 32–36)
MCV RBC AUTO: 92.3 FL — SIGNIFICANT CHANGE UP (ref 80–100)
NRBC # BLD: 0 /100 WBCS — SIGNIFICANT CHANGE UP (ref 0–0)
PHOSPHATE SERPL-MCNC: 2.8 MG/DL — SIGNIFICANT CHANGE UP (ref 2.5–4.5)
PLATELET # BLD AUTO: 252 K/UL — SIGNIFICANT CHANGE UP (ref 150–400)
POTASSIUM SERPL-MCNC: 4.5 MMOL/L — SIGNIFICANT CHANGE UP (ref 3.5–5.3)
POTASSIUM SERPL-SCNC: 4.5 MMOL/L — SIGNIFICANT CHANGE UP (ref 3.5–5.3)
RBC # BLD: 2.84 M/UL — LOW (ref 3.8–5.2)
RBC # FLD: 14 % — SIGNIFICANT CHANGE UP (ref 10.3–14.5)
SODIUM SERPL-SCNC: 138 MMOL/L — SIGNIFICANT CHANGE UP (ref 135–145)
WBC # BLD: 8.48 K/UL — SIGNIFICANT CHANGE UP (ref 3.8–10.5)
WBC # FLD AUTO: 8.48 K/UL — SIGNIFICANT CHANGE UP (ref 3.8–10.5)

## 2022-08-23 PROCEDURE — U0003: CPT

## 2022-08-23 PROCEDURE — 85025 COMPLETE CBC W/AUTO DIFF WBC: CPT

## 2022-08-23 PROCEDURE — 86803 HEPATITIS C AB TEST: CPT

## 2022-08-23 PROCEDURE — 84466 ASSAY OF TRANSFERRIN: CPT

## 2022-08-23 PROCEDURE — 83550 IRON BINDING TEST: CPT

## 2022-08-23 PROCEDURE — 36415 COLL VENOUS BLD VENIPUNCTURE: CPT

## 2022-08-23 PROCEDURE — 99233 SBSQ HOSP IP/OBS HIGH 50: CPT

## 2022-08-23 PROCEDURE — 80053 COMPREHEN METABOLIC PANEL: CPT

## 2022-08-23 PROCEDURE — 88307 TISSUE EXAM BY PATHOLOGIST: CPT

## 2022-08-23 PROCEDURE — 83735 ASSAY OF MAGNESIUM: CPT

## 2022-08-23 PROCEDURE — U0005: CPT

## 2022-08-23 PROCEDURE — 82962 GLUCOSE BLOOD TEST: CPT

## 2022-08-23 PROCEDURE — 86900 BLOOD TYPING SEROLOGIC ABO: CPT

## 2022-08-23 PROCEDURE — C9399: CPT

## 2022-08-23 PROCEDURE — 81001 URINALYSIS AUTO W/SCOPE: CPT

## 2022-08-23 PROCEDURE — 82728 ASSAY OF FERRITIN: CPT

## 2022-08-23 PROCEDURE — 86901 BLOOD TYPING SEROLOGIC RH(D): CPT

## 2022-08-23 PROCEDURE — 76775 US EXAM ABDO BACK WALL LIM: CPT

## 2022-08-23 PROCEDURE — 83540 ASSAY OF IRON: CPT

## 2022-08-23 PROCEDURE — 86923 COMPATIBILITY TEST ELECTRIC: CPT

## 2022-08-23 PROCEDURE — 82570 ASSAY OF URINE CREATININE: CPT

## 2022-08-23 PROCEDURE — 80048 BASIC METABOLIC PNL TOTAL CA: CPT

## 2022-08-23 PROCEDURE — 85384 FIBRINOGEN ACTIVITY: CPT

## 2022-08-23 PROCEDURE — 85027 COMPLETE CBC AUTOMATED: CPT

## 2022-08-23 PROCEDURE — 87086 URINE CULTURE/COLONY COUNT: CPT

## 2022-08-23 PROCEDURE — 85730 THROMBOPLASTIN TIME PARTIAL: CPT

## 2022-08-23 PROCEDURE — 74174 CTA ABD&PLVS W/CONTRAST: CPT

## 2022-08-23 PROCEDURE — 84100 ASSAY OF PHOSPHORUS: CPT

## 2022-08-23 PROCEDURE — 84300 ASSAY OF URINE SODIUM: CPT

## 2022-08-23 PROCEDURE — 85610 PROTHROMBIN TIME: CPT

## 2022-08-23 PROCEDURE — 86850 RBC ANTIBODY SCREEN: CPT

## 2022-08-23 PROCEDURE — P9016: CPT

## 2022-08-23 PROCEDURE — 36430 TRANSFUSION BLD/BLD COMPNT: CPT

## 2022-08-23 PROCEDURE — 74176 CT ABD & PELVIS W/O CONTRAST: CPT

## 2022-08-23 PROCEDURE — 83935 ASSAY OF URINE OSMOLALITY: CPT

## 2022-08-23 RX ORDER — ACETAMINOPHEN 500 MG
975 TABLET ORAL ONCE
Refills: 0 | Status: COMPLETED | OUTPATIENT
Start: 2022-08-23 | End: 2022-08-23

## 2022-08-23 RX ORDER — ACETAMINOPHEN 500 MG
2 TABLET ORAL
Qty: 0 | Refills: 0 | DISCHARGE
Start: 2022-08-23

## 2022-08-23 RX ADMIN — Medication 975 MILLIGRAM(S): at 00:12

## 2022-08-23 RX ADMIN — Medication 975 MILLIGRAM(S): at 01:12

## 2022-08-23 RX ADMIN — Medication 1000 MILLIGRAM(S): at 07:02

## 2022-08-23 NOTE — PROGRESS NOTE ADULT - ASSESSMENT
66F with pmhx of IBS who presented to the hospital on 8/19 in the setting of scheduled ANTWON and BSO for fibroid uterus. Found to have rising creatinine post-op likely in the setting of shifting hemodynamics    Assessment/Plan:   #pre-renal azotemia -resolved   #Hypoperfusion  Baseline creatinine around 0.7~. EUGENE now resolved.   Pt noted to have elevated creatinine after post-op, initial thoughts were could this be due to hypotension (pt has had soft BP's) which could lead to an iATN or if there was any type of injury to the ureter or bladder. CT scan performed with findings of hemoperitoneum and hematoma, but no injury to structures mentioned. Likely had a component of hypoperfusion as well as compression from the blood in the abdomen.       #Anemia  Hgb at 8.6   with interval decrease in size of large hemoperitoneum.       Nephrology to sign off. Please reconsult as needed. Thank you.

## 2022-08-23 NOTE — DISCHARGE NOTE NURSING/CASE MANAGEMENT/SOCIAL WORK - PATIENT PORTAL LINK FT
You can access the FollowMyHealth Patient Portal offered by [facility] by registering at the following website: [email or web address]. By joining [facility]’s FollowMyHealth portal, you will also be able to view your health information using other applications (apps) compatible with our system.

## 2022-08-23 NOTE — DISCHARGE NOTE PROVIDER - HOSPITAL COURSE
Patient had a ANTWON and BSO performed for 24w fibroid uterus. POD1 Hb noted to be 8.8 with repeat 8.4. She was found to have a pre-renal EUGENE for which she received a 1L bolus. Hb dropped to 7.3 and nephrology and medicine were consulted. She received 1 u pRBCs, with post transfusion CBC 7.0 and a CT w/o contrast which showed large hemoperitoneum, a 14cm hematoma with mild bilateral hydronephrosis. Urology was consulted for possible stent placement, which they did not recommend. UA was positive and urine culture was sent. On POD2 IR was consulted and did not recommend draining the hematoma. Bleeding likely from vaginal cuff. Nephrology recommended IV iron, which was ordered. 2 additional u pRBCs were given, with post transfusion CBC 7.9 with repeat 8.0. POD3 CT angio was negative for active bleeding and IR signed off. Medicine and urology teams stated EUGENE resolved. CBC was 8.3. POD4 CBC 8.6, patient stable and well appearing.

## 2022-08-23 NOTE — PROGRESS NOTE ADULT - SUBJECTIVE AND OBJECTIVE BOX
GYN Post Op Check     Patient seen at bedside.  Pain controlled. Tolerating clears.  No OOB yet.  Stark in place.  Denies CP, palpitations, SOB, fever, chills, nausea, vomiting.    Vital Signs Last 24 Hrs  T(C): 36.5 (19 Aug 2022 13:40), Max: 36.5 (19 Aug 2022 13:40)  T(F): 97.7 (19 Aug 2022 13:40), Max: 97.7 (19 Aug 2022 13:40)  HR: 68 (19 Aug 2022 13:40) (63 - 76)  BP: 139/80 (19 Aug 2022 13:40) (118/78 - 147/73)  BP(mean): 103 (19 Aug 2022 12:30) (91 - 104)  RR: 17 (19 Aug 2022 13:40) (12 - 41)  SpO2: 97% (19 Aug 2022 13:40) (96% - 100%)    Parameters below as of 19 Aug 2022 13:40  Patient On (Oxygen Delivery Method): room air        Physical Exam:  Gen: No Acute Distress  Pulm: Clear to auscultation bilaterally  GI: soft, appropriately tender, non distended, decreased BS, no rebound, no guarding.  incision C/D/I  : Stark in place  Ext: SCDs in place, wnl    I&O's Summary    19 Aug 2022 07:01  -  19 Aug 2022 14:43  --------------------------------------------------------  IN: 550 mL / OUT: 250 mL / NET: 300 mL      MEDICATIONS  (STANDING):  acetaminophen     Tablet .. 1000 milliGRAM(s) Oral every 6 hours  famotidine Injectable 20 milliGRAM(s) IV Push daily  ketorolac   Injectable 30 milliGRAM(s) IV Push every 6 hours  lactated ringers. 1000 milliLiter(s) (125 mL/Hr) IV Continuous <Continuous>  pantoprazole  Injectable 40 milliGRAM(s) IV Push every 24 hours    MEDICATIONS  (PRN):  acetaminophen     Tablet .. 1000 milliGRAM(s) Oral every 6 hours PRN Mild Pain (1 - 3)  HYDROmorphone  Injectable 0.5 milliGRAM(s) IV Push every 15 minutes PRN Severe Pain (7 - 10)  ibuprofen  Tablet. 600 milliGRAM(s) Oral every 6 hours PRN Mild Pain (1 - 3)  ondansetron    Tablet 8 milliGRAM(s) Oral every 8 hours PRN Nausea and/or Vomiting  oxyCODONE    IR 5 milliGRAM(s) Oral every 3 hours PRN Moderate Pain (4 - 6)  oxyCODONE    IR 10 milliGRAM(s) Oral every 4 hours PRN Severe Pain (7 - 10)  simethicone 80 milliGRAM(s) Chew every 6 hours PRN Gas    Allergies    penicillin (Rash)    Intolerances        LABS:                  
INTERVAL EVENTS: ALEJANDRO    SUBJECTIVE / INTERVAL HPI: Patient seen and examined at bedside. Reports feeling anxious about her stay and is worried about her prognosis. Denies abd pain. Has been urinating well.    ROS: negative unless otherwise stated above.    VITAL SIGNS:  Vital Signs Last 24 Hrs  T(C): 36.8 (22 Aug 2022 09:35), Max: 37.2 (21 Aug 2022 16:06)  T(F): 98.2 (22 Aug 2022 09:35), Max: 99 (21 Aug 2022 16:06)  HR: 73 (22 Aug 2022 09:35) (59 - 93)  BP: 98/62 (22 Aug 2022 09:35) (93/59 - 108/68)  BP(mean): --  RR: 16 (22 Aug 2022 09:35) (16 - 17)  SpO2: 98% (22 Aug 2022 09:35) (94% - 98%)    Parameters below as of 22 Aug 2022 05:36  Patient On (Oxygen Delivery Method): room air          22 @ 07:01  -  22 @ 07:00  --------------------------------------------------------  IN: 0 mL / OUT: 1950 mL / NET: -1950 mL        PHYSICAL EXAM:    General: NAD, sitting up in bed  HEENT: MMM, conjunctiva not pale  Cardiovascular: RRR, no JVD  Respiratory: CTA B/L; no W/R/R  Gastrointestinal: soft, mildly distended, NT  Extremities: WWP; no edema, clubbing or cyanosis  Neurological: AAOx3    MEDICATIONS:  MEDICATIONS  (STANDING):  acetaminophen     Tablet .. 1000 milliGRAM(s) Oral every 6 hours  famotidine    Tablet 20 milliGRAM(s) Oral every 24 hours  iron sucrose IVPB 200 milliGRAM(s) IV Intermittent every 24 hours  lactated ringers. 1000 milliLiter(s) (110 mL/Hr) IV Continuous <Continuous>  pantoprazole    Tablet 40 milliGRAM(s) Oral every 24 hours    MEDICATIONS  (PRN):  acetaminophen     Tablet .. 1000 milliGRAM(s) Oral every 6 hours PRN Mild Pain (1 - 3)  ALPRAZolam 0.25 milliGRAM(s) Oral at bedtime PRN anxiety  HYDROmorphone  Injectable 0.5 milliGRAM(s) IV Push every 15 minutes PRN Severe Pain (7 - 10)  phenazopyridine 200 milliGRAM(s) Oral every 8 hours PRN bladder spasm  simethicone 80 milliGRAM(s) Chew every 6 hours PRN Gas  traMADol 25 milliGRAM(s) Oral every 6 hours PRN Moderate Pain (4 - 6)      ALLERGIES:  Allergies    penicillin (Rash)    Intolerances        LABS:                        8.0    7.58  )-----------( 201      ( 22 Aug 2022 09:07 )             24.2     08-22    143  |  108  |  9   ----------------------------<  112<H>  3.9   |  28  |  0.62    Ca    8.4      22 Aug 2022 09:07  Phos  2.7     08-22  Mg     1.9     08-22    TPro  4.8<L>  /  Alb  2.8<L>  /  TBili  1.1  /  DBili  x   /  AST  11  /  ALT  6<L>  /  AlkPhos  49  08-21    PT/INR - ( 22 Aug 2022 09:07 )   PT: 11.9 sec;   INR: 1.00          PTT - ( 22 Aug 2022 09:07 )  PTT:29.7 sec  Urinalysis Basic - ( 20 Aug 2022 14:38 )    Color: Yellow / Appearance: Clear / S.010 / pH: x  Gluc: x / Ketone: NEGATIVE  / Bili: Negative / Urobili: 0.2 E.U./dL   Blood: x / Protein: NEGATIVE mg/dL / Nitrite: POSITIVE   Leuk Esterase: NEGATIVE / RBC: < 5 /HPF / WBC < 5 /HPF   Sq Epi: x / Non Sq Epi: 0-5 /HPF / Bacteria: Present /HPF      CAPILLARY BLOOD GLUCOSE      POCT Blood Glucose.: 96 mg/dL (21 Aug 2022 17:21)      RADIOLOGY & ADDITIONAL TESTS: Reviewed.
Pt seen and examined at bedside. Pt states mild abdominal pain. Pt ambulating, tolerating reg diet, + flatus, + BM, urinating adequately.   Pt states that she feels well last night and this morning. She has some tenderness in the lower abdomen with movement, but that it is improving. She denies SOB, nausea, vomiting, dizziness.      T(F): 98 (08-23-22 @ 06:18), Max: 98.5 (08-22-22 @ 15:27)  HR: 79 (08-23-22 @ 06:18) (73 - 90)  BP: 101/61 (08-23-22 @ 06:18) (98/62 - 108/65)  RR: 17 (08-23-22 @ 06:18) (16 - 17)  SpO2: 95% (08-23-22 @ 06:18) (94% - 98%)  Wt(kg): --  I&O's Summary    22 Aug 2022 07:01  -  23 Aug 2022 07:00  --------------------------------------------------------  IN: 430 mL / OUT: 0 mL / NET: 430 mL        MEDICATIONS  (STANDING):  acetaminophen     Tablet .. 1000 milliGRAM(s) Oral every 6 hours  famotidine    Tablet 20 milliGRAM(s) Oral every 24 hours  iron sucrose IVPB 200 milliGRAM(s) IV Intermittent every 24 hours  pantoprazole    Tablet 40 milliGRAM(s) Oral every 24 hours    MEDICATIONS  (PRN):  acetaminophen     Tablet .. 1000 milliGRAM(s) Oral every 6 hours PRN Mild Pain (1 - 3)  ALPRAZolam 0.25 milliGRAM(s) Oral at bedtime PRN anxiety  HYDROmorphone  Injectable 0.5 milliGRAM(s) IV Push every 15 minutes PRN Severe Pain (7 - 10)  phenazopyridine 200 milliGRAM(s) Oral every 8 hours PRN bladder spasm  simethicone 80 milliGRAM(s) Chew every 6 hours PRN Gas  traMADol 25 milliGRAM(s) Oral every 6 hours PRN Moderate Pain (4 - 6)      Physical Exam:  Constitutional: NAD, AAOx3  Pulmonary: no increased work of breathing   Abdomen: incision site clean, dry, intact. Soft, non tender, non distended, no guarding, no rebound, + bowel sounds  Extremities: no lower extremity edema or calve tenderness. SCDs in place     LABS:                        8.6    8.48  )-----------( 252      ( 23 Aug 2022 05:30 )             26.2     08-23    138  |  104  |  10  ----------------------------<  98  4.5   |  33<H>  |  0.63    Ca    8.6      23 Aug 2022 05:30  Phos  2.8     08-23  Mg     1.9     08-23      PT/INR - ( 22 Aug 2022 09:07 )   PT: 11.9 sec;   INR: 1.00          PTT - ( 22 Aug 2022 09:07 )  PTT:29.7 sec      
Pt seen and examined at bedside. Pt states mild abdominal pain. Pt not ambulating, tolerating CLD , [-] flatus, Pt denies fever, chills, chest pain, SOB, nausea, vomiting, lightheadedness, dizziness.      T(F): 98 (08-20-22 @ 08:30), Max: 98.2 (08-19-22 @ 20:06)  HR: 82 (08-20-22 @ 08:30) (63 - 100)  BP: 99/63 (08-20-22 @ 08:30) (92/60 - 147/73)  RR: 17 (08-20-22 @ 08:30) (12 - 41)  SpO2: 99% (08-20-22 @ 08:30) (96% - 100%)  Wt(kg): --  I&O's Summary    19 Aug 2022 07:01  -  20 Aug 2022 07:00  --------------------------------------------------------  IN: 1425 mL / OUT: 650 mL / NET: 775 mL        MEDICATIONS  (STANDING):  acetaminophen     Tablet .. 1000 milliGRAM(s) Oral every 6 hours  famotidine Injectable 20 milliGRAM(s) IV Push daily  lactated ringers. 1000 milliLiter(s) (125 mL/Hr) IV Continuous <Continuous>  pantoprazole  Injectable 40 milliGRAM(s) IV Push every 24 hours    MEDICATIONS  (PRN):  acetaminophen     Tablet .. 1000 milliGRAM(s) Oral every 6 hours PRN Mild Pain (1 - 3)  HYDROmorphone  Injectable 0.5 milliGRAM(s) IV Push every 15 minutes PRN Severe Pain (7 - 10)  ondansetron    Tablet 8 milliGRAM(s) Oral every 8 hours PRN Nausea and/or Vomiting  phenazopyridine 200 milliGRAM(s) Oral every 8 hours PRN bladder spasm  simethicone 80 milliGRAM(s) Chew every 6 hours PRN Gas  traMADol 25 milliGRAM(s) Oral every 6 hours PRN Moderate Pain (4 - 6)      Physical Exam:  Constitutional: NAD  Pulmonary: clear to auscultation bilaterally   Cardiovascular: Regular rate and rhythm   Abdomen: incision site clean, dry, intact. Soft, mildly tender, non distended, no guarding, no rebound, + bowel sounds  Extremities: no lower extremity edema or calve tenderness. SCDs in place     LABS:                        8.8    14.05 )-----------( 357      ( 20 Aug 2022 05:24 )             27.1     08-20    133<L>  |  100  |  13  ----------------------------<  159<H>  4.7   |  24  |  1.15    Ca    8.2<L>      20 Aug 2022 05:24  Phos  4.9     08-20  Mg     1.4     08-20    TPro  x   /  Alb  x   /  TBili  0.6  /  DBili  x   /  AST  x   /  ALT  x   /  AlkPhos  x   08-20    PT/INR - ( 20 Aug 2022 05:24 )   PT: 13.1 sec;   INR: 1.10                RADIOLOGY & ADDITIONAL TESTS:    
IDENTIFICATION: This is a 66-yo female with IBS and fibroids now on POD3 following total abdominal hysterectomy (ANTWON) on 19 Aug 2022 for whom surgery was consulted for postoperative bleeding.    EVENTS:  - No acute events.   - CT-A read:     IMPRESSION:  1. Since 8/20/2022, patient is again status post ANTWON-BSO with interval decrease in size of large hemoperitoneum. There is no active site of hemorrhage/bleed identified. No contrast extravasation/intravasation..  2. Small bilateral pleural effusions.    SUBJECTIVE:   - Notes mild cramping in abdomen.   - No N/V  - Denies lightheadedness/dizziness with standing.  - Denies CP, SOB  - Denies new concerns    MEDICATIONS  (STANDING):  acetaminophen     Tablet .. 1000 milliGRAM(s) Oral every 6 hours  famotidine    Tablet 20 milliGRAM(s) Oral every 24 hours  iron sucrose IVPB 200 milliGRAM(s) IV Intermittent every 24 hours  pantoprazole    Tablet 40 milliGRAM(s) Oral every 24 hours    MEDICATIONS  (PRN):  acetaminophen     Tablet .. 1000 milliGRAM(s) Oral every 6 hours PRN Mild Pain (1 - 3)  ALPRAZolam 0.25 milliGRAM(s) Oral at bedtime PRN anxiety  HYDROmorphone  Injectable 0.5 milliGRAM(s) IV Push every 15 minutes PRN Severe Pain (7 - 10)  phenazopyridine 200 milliGRAM(s) Oral every 8 hours PRN bladder spasm  simethicone 80 milliGRAM(s) Chew every 6 hours PRN Gas  traMADol 25 milliGRAM(s) Oral every 6 hours PRN Moderate Pain (4 - 6)      Vital Signs Last 24 Hrs  T(C): 36.6 (23 Aug 2022 08:46), Max: 36.9 (22 Aug 2022 15:27)  T(F): 97.9 (23 Aug 2022 08:46), Max: 98.5 (22 Aug 2022 15:27)  HR: 68 (23 Aug 2022 08:46) (68 - 90)  BP: 115/71 (23 Aug 2022 08:46) (100/62 - 115/71)  BP(mean): --  RR: 16 (23 Aug 2022 08:46) (16 - 17)  SpO2: 93% (23 Aug 2022 08:46) (93% - 95%)    Parameters below as of 23 Aug 2022 08:46  Patient On (Oxygen Delivery Method): room air      Gen: Non-acutely-ill-appearing 67yo female in NAD  Neurologic: AAOx3  Cardiac: Normal rate, regular rhythm  Vascular: Palpable pulses in PT / DP bilaterally  Pulm: Breathing comfortably  Abd: Soft, non-distended; mild ecchymosis on anterior abdominal wall. Mild fullness and mild TTP.   Incision: Well approximated without erythema/edema/induration.   : No Stark  Skin: No rashes  Extremities: No edema.  Psychiatric: Interacting normally    I&O's Detail    22 Aug 2022 07:01  -  23 Aug 2022 07:00  --------------------------------------------------------  IN:    IV PiggyBack: 100 mL    Lactated Ringers: 330 mL  Total IN: 430 mL    OUT:  Total OUT: 0 mL    Total NET: 430 mL          LABS:                        8.6    8.48  )-----------( 252      ( 23 Aug 2022 05:30 )             26.2     08-23    138  |  104  |  10  ----------------------------<  98  4.5   |  33<H>  |  0.63    Ca    8.6      23 Aug 2022 05:30  Phos  2.8     08-23  Mg     1.9     08-23      PT/INR - ( 22 Aug 2022 09:07 )   PT: 11.9 sec;   INR: 1.00          PTT - ( 22 Aug 2022 09:07 )  PTT:29.7 sec      RADIOLOGY & ADDITIONAL STUDIES:  
Patient evaluated at bedside. No acute events overnight. Patient denies chest pain, nausea, vomiting. Pain well controlled on medications. Patient is ambulating independently, voiding spontaneously, passing flatus and stool, and tolerating a regular diet.      T(C): 36.3 (08-22-22 @ 05:36), Max: 37.2 (08-21-22 @ 16:06)  HR: 59 (08-22-22 @ 05:36) (59 - 93)  BP: 104/60 (08-22-22 @ 05:36) (93/59 - 108/68)  RR: 17 (08-22-22 @ 05:36) (16 - 18)  SpO2: 95% (08-22-22 @ 05:36) (94% - 96%)  Wt(kg): --    Gen: Well-appearing. NAD.  Resp: Breathing comfortably on RA.  Abd: Soft, appropriately TTP post-op, mildly distended, +BS  Ext: No calf tenderness        08-20 @ 07:01  -  08-21 @ 07:00  --------------------------------------------------------  IN: 2175 mL / OUT: 3030 mL / NET: -855 mL    08-21 @ 07:01  -  08-22 @ 06:47  --------------------------------------------------------  IN: 0 mL / OUT: 1950 mL / NET: -1950 mL            acetaminophen     Tablet .. 1000 milliGRAM(s) Oral every 6 hours  acetaminophen     Tablet .. 1000 milliGRAM(s) Oral every 6 hours PRN  ALPRAZolam 0.25 milliGRAM(s) Oral at bedtime PRN  famotidine    Tablet 20 milliGRAM(s) Oral every 24 hours  HYDROmorphone  Injectable 0.5 milliGRAM(s) IV Push every 15 minutes PRN  iron sucrose IVPB 200 milliGRAM(s) IV Intermittent every 24 hours  pantoprazole    Tablet 40 milliGRAM(s) Oral every 24 hours  phenazopyridine 200 milliGRAM(s) Oral every 8 hours PRN  simethicone 80 milliGRAM(s) Chew every 6 hours PRN  traMADol 25 milliGRAM(s) Oral every 6 hours PRN            
Patient is a 66y Female seen and evaluated at bedside. Patient in no acute distress and does not offer any complaints at this time. Patients Hgb stable at 8.6. VSS stable.       Meds:    acetaminophen     Tablet .. 1000 every 6 hours  acetaminophen     Tablet .. 1000 every 6 hours PRN  ALPRAZolam 0.25 at bedtime PRN  famotidine    Tablet 20 every 24 hours  HYDROmorphone  Injectable 0.5 every 15 minutes PRN  iron sucrose IVPB 200 every 24 hours  pantoprazole    Tablet 40 every 24 hours  phenazopyridine 200 every 8 hours PRN  simethicone 80 every 6 hours PRN  traMADol 25 every 6 hours PRN      T(C): , Max: 36.9 (08-22-22 @ 15:27)  T(F): , Max: 98.5 (08-22-22 @ 15:27)  HR: 68 (08-23-22 @ 08:46)  BP: 115/71 (08-23-22 @ 08:46)  BP(mean): --  RR: 16 (08-23-22 @ 08:46)  SpO2: 93% (08-23-22 @ 08:46)  Wt(kg): --    08-22 @ 07:01  -  08-23 @ 07:00  --------------------------------------------------------  IN: 430 mL / OUT: 0 mL / NET: 430 mL    08-23 @ 07:01  -  08-23 @ 14:01  --------------------------------------------------------  IN: 240 mL / OUT: 0 mL / NET: 240 mL          Review of Systems:  ROS negative except as per HPI      PHYSICAL EXAM:  General: NAD, sitting up in bed  HEENT: MMM, conjunctiva not pale  Cardiovascular: RRR, no JVD  Respiratory: CTA B/L; no W/R/R  Gastrointestinal: soft, mildly distended, NT  Extremities: WWP; no edema, clubbing or cyanosis  Neurological: AAOx3        LABS:                        8.6    8.48  )-----------( 252      ( 23 Aug 2022 05:30 )             26.2     08-23    138  |  104  |  10  ----------------------------<  98  4.5   |  33<H>  |  0.63    Ca    8.6      23 Aug 2022 05:30  Phos  2.8     08-23  Mg     1.9     08-23        PT/INR - ( 22 Aug 2022 09:07 )   PT: 11.9 sec;   INR: 1.00          PTT - ( 22 Aug 2022 09:07 )  PTT:29.7 sec          RADIOLOGY & ADDITIONAL STUDIES:          
Pt seen and examined at bedside. 3rd unit PRBC currently running. Pt states that she continues to feel well. Reports mild abdominal pain, overall well controlled. Reports brief episode of lightheadedness yesterday when standing but does not feel lightheaded when lying in bed. Pt ambulating, passing flatus. Stark draining light yellow urine. Currently NPO.   Pt denies fever, chills, chest pain, palpitations, SOB, nausea, vomiting, lightheadedness, dizziness.      T(F): 98.3 (22 @ 08:00), Max: 98.6 (22 @ 20:14)  HR: 88 (22 @ 08:00) (83 - 100)  BP: 101/61 (22 @ 08:00) (90/51 - 119/75)  RR: 18 (22 @ 08:00) (16 - 18)  SpO2: 95% (22 @ 05:20) (95% - 97%)  Wt(kg): --  I&O's Summary    20 Aug 2022 07:01  -  21 Aug 2022 07:00  --------------------------------------------------------  IN: 2175 mL / OUT: 3030 mL / NET: -855 mL    21 Aug 2022 07:01  -  21 Aug 2022 09:27  --------------------------------------------------------  IN: 0 mL / OUT: 625 mL / NET: -625 mL        MEDICATIONS  (STANDING):  acetaminophen     Tablet .. 1000 milliGRAM(s) Oral every 6 hours  famotidine Injectable 20 milliGRAM(s) IV Push daily  pantoprazole  Injectable 40 milliGRAM(s) IV Push every 24 hours  sodium chloride 0.9%. 1000 milliLiter(s) (125 mL/Hr) IV Continuous <Continuous>    MEDICATIONS  (PRN):  acetaminophen     Tablet .. 1000 milliGRAM(s) Oral every 6 hours PRN Mild Pain (1 - 3)  ALPRAZolam 0.25 milliGRAM(s) Oral at bedtime PRN anxiety  HYDROmorphone  Injectable 0.5 milliGRAM(s) IV Push every 15 minutes PRN Severe Pain (7 - 10)  phenazopyridine 200 milliGRAM(s) Oral every 8 hours PRN bladder spasm  simethicone 80 milliGRAM(s) Chew every 6 hours PRN Gas  traMADol 25 milliGRAM(s) Oral every 6 hours PRN Moderate Pain (4 - 6)      Physical Exam:  Constitutional: NAD  Pulmonary: no increased work of breathing  Abdomen: incision site clean, dry, intact. Soft, mildly tender, minimal distention, no guarding, no rebound, + bowel sounds  Extremities: no lower extremity edema or calf tenderness. SCDs in place     LABS:                        7.6    8.96  )-----------( 229      ( 21 Aug 2022 03:38 )             22.7     08-21    139  |  108  |  13  ----------------------------<  107<H>  4.2   |  26  |  0.96    Ca    7.9<L>      21 Aug 2022 03:16  Phos  4.5     08-20  Mg     2.0     08-20    TPro  4.8<L>  /  Alb  2.8<L>  /  TBili  1.1  /  DBili  x   /  AST  11  /  ALT  6<L>  /  AlkPhos  49  08-21    PT/INR - ( 21 Aug 2022 03:18 )   PT: 12.8 sec;   INR: 1.07          PTT - ( 21 Aug 2022 03:18 )  PTT:27.2 sec  Urinalysis Basic - ( 20 Aug 2022 14:38 )    Color: Yellow / Appearance: Clear / S.010 / pH: x  Gluc: x / Ketone: NEGATIVE  / Bili: Negative / Urobili: 0.2 E.U./dL   Blood: x / Protein: NEGATIVE mg/dL / Nitrite: POSITIVE   Leuk Esterase: NEGATIVE / RBC: < 5 /HPF / WBC < 5 /HPF   Sq Epi: x / Non Sq Epi: 0-5 /HPF / Bacteria: Present /HPF        RADIOLOGY & ADDITIONAL TESTS:    < from: CT Abdomen and Pelvis No Cont (22 @ 21:15) >    ******PRELIMINARY REPORT******      ******PRELIMINARY REPORT******       ACC: [id number] EXAM:  CT ABDOMEN AND PELVIS                          PROCEDURE DATE:  2022    ******PRELIMINARY REPORT******      ******PRELIMINARY REPORT******           INTERPRETATION:  [facility] RADIOLOGIST PRELIMINARY REPORT      Initial report created on 2022 9:36:15 PM EDT  PROCEDURE INFORMATION:  Exam: CT Abdomen And Pelvis Without Contrast  Exam date and time: 2022 8:06 PM  Age: 66 years old  Clinical indication: Other: Pod1 S/P total abdominal hysterectomy w/drop   large  hg althou gh clinically stable    TECHNIQUE:  Imaging protocol: Computed tomography of the abdomen and pelvis without  contrast.    COMPARISON:  No relevant prior studies available.    FINDINGS:  Diaphragm: Moderate hiatal hernia.    Liver: Small incidental hepatic cysts.  Gallbladder and bile ducts: Gallbladder sludge. No cholecystitis or   biliary  ductal dilatation.  Pancreas: Unremarkable.  Spleen: Normal.  Adrenal glands: Normal. No mass.  Kidneys and ureters: Bilateral renal pelvic cysts.  Stomach and bowel: Stranding adjacent to a diverticular segment of the   sigmoid  colon (images , axial series 3) is suspicious for acute   diverticulitis  though this may be edema/stranding related to recent surgery and presence   of  large volume hemoperitoneum. No intestinal obstruction. Bibasilar   atelectasis.  Appendix: Appendix not visualized. No evidence of appendicitis.    Intraperitoneal space: Large volume hemoperitoneum in all 4 quadrants and   the  pelvis in addition to a formed 14 cm hematoma in anterior intraperitoneal  pelvis. Small amount of hematoma at the vaginal cuff. Expected amount   recent  postoperative pneumoperitoneum .  Vasculature: Unremarkable.  Lymph nodes: Unremarkable.  Urinary bladder: Unremarkable as visualized.  Reproductive: See &quot;Intraperitoneal space&quot; finding.  Bones/joints: Unremarkable. No acute fracture.  Soft tissues: Expected postoperative changes of the anterior abdominal   wall.    IMPRESSION:  1. Large volume hemoperitoneum in all 4 quadrants and the pelvis in   addition to  a formed 14 cm hematoma in anterior intraperitoneal pelvis. Small amount   of  hematoma at the vaginal cuff.  2. Stranding adjacent to a diverticular segment of the sigmoid colon   (images  , axial series 3) is suspicious for acute diverticulitis though   this may  be edema/stranding related to recent surgery and presence of large volume  hemoperitoneum.    Findings discussed with Adriana PHILLIPS at time of interpretation.        ******PRELIMINARY REPORT******      ******PRELIMINARY REPORT******         KLAUS PEPPER M.D.;[facility] RADIOLOGIST  This document is a PRELIMINARY interpretation and is pending final   attending approval.Aug 20 2022  9:36PM    < end of copied text >  
INTERVAL HPI/OVERNIGHT EVENTS:  Patient was seen and examined at bedside. As per nurse and patient, no o/n events. Hb noted to drop 1 unit o/n and plan for CTA Abd/P  VITAL SIGNS:  T(F): 98.5 (08-22-22 @ 15:27)  HR: 77 (08-22-22 @ 15:27)  BP: 108/65 (08-22-22 @ 15:27)  RR: 17 (08-22-22 @ 15:27)  SpO2: 95% (08-22-22 @ 15:27)  Wt(kg): --    PHYSICAL EXAM:    Constitutional: NAD laying in bed   HEENT: PERRL, EOMI  Respiratory: CTA b/l, good air entry b/l, no wheezing, no rhonchi, no rales, without accessory muscle use and no intercostal retractions  Cardiovascular: RRR, normal S1S2, no M/R/G  Gastrointestinal: soft, tenderness to deep palpation ND, no masses palpable, BS normal  Extremities: Warm, well perfused, pulses equal bilateral upper and lower extremities, no edema, no clubbing. Capillary refill <2 sec  Neurological: AAOx3, CN Grossly intact  Skin: suprapubic mole irregular borders 2cm x1.5cm,  Normal temperature, warm, dry    MEDICATIONS  (STANDING):  acetaminophen     Tablet .. 1000 milliGRAM(s) Oral every 6 hours  famotidine    Tablet 20 milliGRAM(s) Oral every 24 hours  iron sucrose IVPB 200 milliGRAM(s) IV Intermittent every 24 hours  lactated ringers. 1000 milliLiter(s) (110 mL/Hr) IV Continuous <Continuous>  pantoprazole    Tablet 40 milliGRAM(s) Oral every 24 hours    MEDICATIONS  (PRN):  acetaminophen     Tablet .. 1000 milliGRAM(s) Oral every 6 hours PRN Mild Pain (1 - 3)  ALPRAZolam 0.25 milliGRAM(s) Oral at bedtime PRN anxiety  HYDROmorphone  Injectable 0.5 milliGRAM(s) IV Push every 15 minutes PRN Severe Pain (7 - 10)  phenazopyridine 200 milliGRAM(s) Oral every 8 hours PRN bladder spasm  simethicone 80 milliGRAM(s) Chew every 6 hours PRN Gas  traMADol 25 milliGRAM(s) Oral every 6 hours PRN Moderate Pain (4 - 6)      Allergies    penicillin (Rash)    Intolerances        LABS:                        8.4    8.28  )-----------( 236      ( 22 Aug 2022 15:34 )             25.6     08-22    143  |  108  |  9   ----------------------------<  112<H>  3.9   |  28  |  0.62    Ca    8.4      22 Aug 2022 09:07  Phos  2.7     08-22  Mg     1.9     08-22    TPro  4.8<L>  /  Alb  2.8<L>  /  TBili  1.1  /  DBili  x   /  AST  11  /  ALT  6<L>  /  AlkPhos  49  08-21    PT/INR - ( 22 Aug 2022 09:07 )   PT: 11.9 sec;   INR: 1.00          PTT - ( 22 Aug 2022 09:07 )  PTT:29.7 sec      RADIOLOGY & ADDITIONAL TESTS:  Reviewed

## 2022-08-23 NOTE — DISCHARGE NOTE PROVIDER - NSDCMRMEDTOKEN_GEN_ALL_CORE_FT
acetaminophen 500 mg oral tablet: 2 tab(s) orally every 6 hours  acetaminophen 500 mg oral tablet: 2 tab(s) orally every 6 hours, As needed, Mild Pain (1 - 3)  Xanax 0.25 mg oral tablet: orally prn, As Needed

## 2022-08-23 NOTE — PROGRESS NOTE ADULT - ASSESSMENT
This is a 66-yo female with IBS and fibroids now on POD4 following total abdominal hysterectomy (ANTWON) on 19 Aug 2022 for whom surgery was consulted for postoperative bleeding.    Thinking: Apparently stabilized intraabdominal bleeding. Reassuring against ongoing intraabdominal hemorrhage are CT-Angio A/P without active extravasation, normal hemodynamic parameters and stabilizing Hb/Hct trend. Similarly, she is essentially asymptomatic and does not endorse symptoms of orthostatic changes.      - No acute procedural intervention of probable benefit.   - Vascular surgery to sign off at this time. Please reconsult with new questions. Appreciate the invitation to be involved in Ms. Roche's care.

## 2022-08-23 NOTE — DISCHARGE NOTE PROVIDER - CARE PROVIDERS DIRECT ADDRESSES
Patient advised to call her cardiologists office for a refill on her bumetadine. Patient verbalizes understanding.    ,DirectAddress_Unknown

## 2022-08-23 NOTE — DISCHARGE NOTE PROVIDER - CARE PROVIDER_API CALL
Hillary Yin)  Gynecologic Oncology; Obstetrics and Gynecology  [address] [place] [address]  Phone: [phone]  Fax: [phone]  Follow Up Time:

## 2022-08-23 NOTE — PROGRESS NOTE ADULT - ATTENDING COMMENTS
Patient was seen and examined with the resident team today.  I agree with Dr. Pizano's assessment and plan with the following exceptions/additions:     Briefly, this is a 65yo woman with a PMH of IBS who is s/p an elective ANTWON 2/2 fibroid uterus for whom Medicine was consulted for acute blood loss anemia and EUGENE.    -- HD stable s/p 2U of PRBC's as this point   -- CTA and IR consult as per primary team   -- EUGENE resolved and Nephrology consulted for this     Will sign-off at this time.    Jennifer Ledesma  [phone]
have reviewed status to date and examined the pt.   pt feels s/w better, is nonoliguric, and her creat has retruned to normal.    hgb is s/w better/ .   would anticipate cont'd improvement, and have discussed with pt and family.
have reviewed above and discussed with staff   agree with findings and recommendations.

## 2022-08-23 NOTE — PROGRESS NOTE ADULT - ASSESSMENT
POD4    65yo s/p ANTWON, BSO for fibroid uterus, about 24w in size    Neuro: tylenol, tramadol PRN, xanex 0.25 QHS PRN  CV: hypovolemic, prerenal EUGENE  Lung: RA  GI/FEN: Reg diet, heplock, +/+, simethicone, protonix, pepcid ATC  : Pre-renal EUGENE: FeNa 00.1, (highest Cr 1.24, most recent 0.77) - improved. S/p cortez, voiding. pyridium 200mg q8h PRN bladder spasm   HEME: anemia H 8.8>8.4>(1L bolus)>7.3>(1U PRBC)>7.0>7.6>(2U PRBC)>9.2>8.8>7.9>8.0>8.3>8.6; IV iron 200mg QD (8/21-)  VTE: SCDs    #pelvic hematoma  - decrease in interval size  - no active bleeding  - decrease in pain per patient  - no need for IR intervention  - Appreciate IR recommendations     #Acute blood loss anemia  - S/p 3U PRBC  - Stable with Hb 8.6  - IV iron    #Pre-renal EUGENE  - Resolved per IM and urology teams  - Cr stable at 1.9  - Voiding  - Appreciate nephrology recs  - Appreciate urology recs  - Appreciate medicine recs    - D/w dr. Montague, GYN Attending   POD4    67yo s/p ANTWON, BSO for fibroid uterus, about 24w in size    Neuro: tylenol, tramadol PRN, xanex 0.25 QHS PRN  CV: hypovolemic, prerenal EUGENE  Lung: RA  GI/FEN: Reg diet, heplock, +/+, simethicone, protonix, pepcid ATC  : Pre-renal EUGENE: FeNa 00.1, (highest Cr 1.24, most recent 0.77) - improved. S/p cortez, voiding. pyridium 200mg q8h PRN bladder spasm   HEME: anemia H 8.8>8.4>(1L bolus)>7.3>(1U PRBC)>7.0>7.6>(2U PRBC)>9.2>8.8>7.9>8.0>8.3>8.6; IV iron 200mg QD (8/21-)  VTE: SCDs    #pelvic hematoma  - decrease in interval size  - no active bleeding  - decrease in pain per patient  - no need for IR intervention  - Appreciate IR recommendations     #Acute blood loss anemia  - S/p 3U PRBC  - Stable with Hb 8.6  - IV iron    #Pre-renal EUGENE  - Resolved per IM and urology teams  - Cr stable at 0.63   - Voiding  - Appreciate nephrology recs  - Appreciate urology recs  - Appreciate medicine recs    - D/w dr. Montague, GYN Attending

## 2022-08-25 LAB — SURGICAL PATHOLOGY STUDY: SIGNIFICANT CHANGE UP

## 2022-08-26 DIAGNOSIS — E86.0 DEHYDRATION: ICD-10-CM

## 2022-08-26 DIAGNOSIS — D25.9 LEIOMYOMA OF UTERUS, UNSPECIFIED: ICD-10-CM

## 2022-08-26 DIAGNOSIS — N17.9 ACUTE KIDNEY FAILURE, UNSPECIFIED: ICD-10-CM

## 2022-08-26 DIAGNOSIS — K66.1 HEMOPERITONEUM: ICD-10-CM

## 2022-08-26 DIAGNOSIS — K58.9 IRRITABLE BOWEL SYNDROME WITHOUT DIARRHEA: ICD-10-CM

## 2022-08-26 DIAGNOSIS — I95.9 HYPOTENSION, UNSPECIFIED: ICD-10-CM

## 2022-08-26 DIAGNOSIS — F41.9 ANXIETY DISORDER, UNSPECIFIED: ICD-10-CM

## 2022-08-26 DIAGNOSIS — J90 PLEURAL EFFUSION, NOT ELSEWHERE CLASSIFIED: ICD-10-CM

## 2022-08-26 DIAGNOSIS — K44.9 DIAPHRAGMATIC HERNIA WITHOUT OBSTRUCTION OR GANGRENE: ICD-10-CM

## 2022-08-26 DIAGNOSIS — D62 ACUTE POSTHEMORRHAGIC ANEMIA: ICD-10-CM

## 2022-08-26 DIAGNOSIS — N99.840 POSTPROCEDURAL HEMATOMA OF A GENITOURINARY SYSTEM ORGAN OR STRUCTURE FOLLOWING A GENITOURINARY SYSTEM PROCEDURE: ICD-10-CM

## 2022-08-26 DIAGNOSIS — Z88.0 ALLERGY STATUS TO PENICILLIN: ICD-10-CM

## 2024-10-04 NOTE — ASU PATIENT PROFILE, ADULT - PREOP PAIN SCORE
Diabetic Foot Exam    Patient's shoes and socks removed.    Right Foot/Ankle   Right Foot Inspection  Skin Exam: skin normal and skin intact. No dry skin, no warmth, no callus, no erythema, no maceration, no abnormal color, no pre-ulcer, no ulcer and no callus.     Toe Exam: ROM and strength within normal limits.     Sensory   Monofilament testing: intact    Vascular  Capillary refills: < 3 seconds  The right DP pulse is 2+.     Left Foot/Ankle  Left Foot Inspection  Skin Exam: skin normal and skin intact. No dry skin, no warmth, no erythema, no maceration, normal color, no pre-ulcer, no ulcer and no callus.     Toe Exam: ROM and strength within normal limits.     Sensory   Monofilament testing: intact    Vascular  Capillary refills: < 3 seconds  The left DP pulse is 2+.     Assign Risk Category  No deformity present  No loss of protective sensation  No weak pulses  Risk: 0     0

## (undated) DEVICE — GLV 6.5 PROTEXIS (WHITE)

## (undated) DEVICE — VENODYNE/SCD SLEEVE CALF MEDIUM

## (undated) DEVICE — DRAINAGE BAG URINARY 4000CC

## (undated) DEVICE — LIGASURE IMPACT

## (undated) DEVICE — WARMING BLANKET UPPER ADULT

## (undated) DEVICE — TUBING TUR 2 PRONG

## (undated) DEVICE — SUT VICRYL PLUS 0 36" CT-1

## (undated) DEVICE — SUT VICRYL 0 18" TIES

## (undated) DEVICE — STAPLER SKIN INSORB

## (undated) DEVICE — PACK EXPLORATORY LAPAROTOMY

## (undated) DEVICE — SUT PLAIN GUT 2-0 27" CT-1

## (undated) DEVICE — CATH ANGIOCATH 12G

## (undated) DEVICE — SUT MONOCRYL 4-0 27" PS-2 UNDYED

## (undated) DEVICE — PACK GYN WDC

## (undated) DEVICE — SUT PDS II 1 96" XLH

## (undated) DEVICE — POSITIONER FOAM EGG CRATE ULNAR 2PCS (PINK)

## (undated) DEVICE — PACK GENERAL CLOSING

## (undated) DEVICE — SUT VICRYL 0 18" CT-1 UNDYED (POP-OFF)

## (undated) DEVICE — FOLEY TRAY 16FR 5CC LF UMETER CLOSED